# Patient Record
Sex: FEMALE | Race: WHITE | NOT HISPANIC OR LATINO | Employment: OTHER | URBAN - NONMETROPOLITAN AREA
[De-identification: names, ages, dates, MRNs, and addresses within clinical notes are randomized per-mention and may not be internally consistent; named-entity substitution may affect disease eponyms.]

---

## 2020-02-02 ENCOUNTER — APPOINTMENT (EMERGENCY)
Dept: CT IMAGING | Facility: HOSPITAL | Age: 80
DRG: 065 | End: 2020-02-02
Payer: COMMERCIAL

## 2020-02-02 ENCOUNTER — HOSPITAL ENCOUNTER (INPATIENT)
Facility: HOSPITAL | Age: 80
LOS: 6 days | DRG: 065 | End: 2020-02-08
Attending: EMERGENCY MEDICINE | Admitting: INTERNAL MEDICINE
Payer: COMMERCIAL

## 2020-02-02 ENCOUNTER — APPOINTMENT (EMERGENCY)
Dept: RADIOLOGY | Facility: HOSPITAL | Age: 80
DRG: 065 | End: 2020-02-02
Payer: COMMERCIAL

## 2020-02-02 DIAGNOSIS — J18.9 PNEUMONIA OF BOTH LOWER LOBES DUE TO INFECTIOUS ORGANISM: ICD-10-CM

## 2020-02-02 DIAGNOSIS — K59.00 CONSTIPATION: ICD-10-CM

## 2020-02-02 DIAGNOSIS — I63.9 ACUTE CVA (CEREBROVASCULAR ACCIDENT) (HCC): ICD-10-CM

## 2020-02-02 DIAGNOSIS — R53.1 GENERALIZED WEAKNESS: Primary | ICD-10-CM

## 2020-02-02 DIAGNOSIS — G45.9 TRANSIENT ISCHEMIC ATTACK, ACUTE: ICD-10-CM

## 2020-02-02 DIAGNOSIS — I10 ESSENTIAL HYPERTENSION: ICD-10-CM
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
## 2020-02-02 DIAGNOSIS — R26.2 AMBULATORY DYSFUNCTION: ICD-10-CM

## 2020-02-02 LAB
ALBUMIN SERPL BCP-MCNC: 3.6 G/DL (ref 3.5–5)
ALP SERPL-CCNC: 96 U/L (ref 46–116)
ALT SERPL W P-5'-P-CCNC: 21 U/L (ref 12–78)
ANION GAP SERPL CALCULATED.3IONS-SCNC: 11 MMOL/L (ref 4–13)
AST SERPL W P-5'-P-CCNC: 21 U/L (ref 5–45)
BACTERIA UR QL AUTO: ABNORMAL /HPF
BASOPHILS # BLD AUTO: 0.03 THOUSANDS/ΜL (ref 0–0.1)
BASOPHILS NFR BLD AUTO: 0 % (ref 0–1)
BILIRUB SERPL-MCNC: 0.83 MG/DL (ref 0.2–1)
BILIRUB UR QL STRIP: ABNORMAL
BUN SERPL-MCNC: 19 MG/DL (ref 5–25)
CALCIUM SERPL-MCNC: 9 MG/DL (ref 8.3–10.1)
CHLORIDE SERPL-SCNC: 101 MMOL/L (ref 100–108)
CLARITY UR: CLEAR
CO2 SERPL-SCNC: 28 MMOL/L (ref 21–32)
COLOR UR: YELLOW
CREAT SERPL-MCNC: 0.81 MG/DL (ref 0.6–1.3)
EOSINOPHIL # BLD AUTO: 0.02 THOUSAND/ΜL (ref 0–0.61)
EOSINOPHIL NFR BLD AUTO: 0 % (ref 0–6)
ERYTHROCYTE [DISTWIDTH] IN BLOOD BY AUTOMATED COUNT: 13.3 % (ref 11.6–15.1)
FLUAV RNA NPH QL NAA+PROBE: NORMAL
FLUBV RNA NPH QL NAA+PROBE: NORMAL
GFR SERPL CREATININE-BSD FRML MDRD: 70 ML/MIN/1.73SQ M
GLUCOSE SERPL-MCNC: 136 MG/DL (ref 65–140)
GLUCOSE SERPL-MCNC: 142 MG/DL (ref 65–140)
GLUCOSE UR STRIP-MCNC: NEGATIVE MG/DL
HCT VFR BLD AUTO: 46.2 % (ref 34.8–46.1)
HGB BLD-MCNC: 14.7 G/DL (ref 11.5–15.4)
HGB UR QL STRIP.AUTO: ABNORMAL
HYALINE CASTS #/AREA URNS LPF: ABNORMAL /LPF
IMM GRANULOCYTES # BLD AUTO: 0.05 THOUSAND/UL (ref 0–0.2)
IMM GRANULOCYTES NFR BLD AUTO: 0 % (ref 0–2)
KETONES UR STRIP-MCNC: ABNORMAL MG/DL
LACTATE SERPL-SCNC: 1.6 MMOL/L (ref 0.5–2)
LEUKOCYTE ESTERASE UR QL STRIP: NEGATIVE
LYMPHOCYTES # BLD AUTO: 1.3 THOUSANDS/ΜL (ref 0.6–4.47)
LYMPHOCYTES NFR BLD AUTO: 9 % (ref 14–44)
MCH RBC QN AUTO: 25.7 PG (ref 26.8–34.3)
MCHC RBC AUTO-ENTMCNC: 31.8 G/DL (ref 31.4–37.4)
MCV RBC AUTO: 81 FL (ref 82–98)
MONOCYTES # BLD AUTO: 1.28 THOUSAND/ΜL (ref 0.17–1.22)
MONOCYTES NFR BLD AUTO: 9 % (ref 4–12)
NEUTROPHILS # BLD AUTO: 12.4 THOUSANDS/ΜL (ref 1.85–7.62)
NEUTS SEG NFR BLD AUTO: 82 % (ref 43–75)
NITRITE UR QL STRIP: NEGATIVE
NON-SQ EPI CELLS URNS QL MICRO: ABNORMAL /HPF
NRBC BLD AUTO-RTO: 0 /100 WBCS
NT-PROBNP SERPL-MCNC: 298 PG/ML
PH UR STRIP.AUTO: 6 [PH]
PLATELET # BLD AUTO: 277 THOUSANDS/UL (ref 149–390)
PMV BLD AUTO: 11.1 FL (ref 8.9–12.7)
POTASSIUM SERPL-SCNC: 3.7 MMOL/L (ref 3.5–5.3)
PROT SERPL-MCNC: 7.7 G/DL (ref 6.4–8.2)
PROT UR STRIP-MCNC: ABNORMAL MG/DL
RBC # BLD AUTO: 5.72 MILLION/UL (ref 3.81–5.12)
RBC #/AREA URNS AUTO: ABNORMAL /HPF
RSV RNA NPH QL NAA+PROBE: NORMAL
SODIUM SERPL-SCNC: 140 MMOL/L (ref 136–145)
SP GR UR STRIP.AUTO: 1.02 (ref 1–1.03)
TROPONIN I SERPL-MCNC: <0.02 NG/ML
UROBILINOGEN UR QL STRIP.AUTO: 0.2 E.U./DL
WBC # BLD AUTO: 15.08 THOUSAND/UL (ref 4.31–10.16)
WBC #/AREA URNS AUTO: ABNORMAL /HPF

## 2020-02-02 PROCEDURE — 96361 HYDRATE IV INFUSION ADD-ON: CPT

## 2020-02-02 PROCEDURE — 93005 ELECTROCARDIOGRAM TRACING: CPT

## 2020-02-02 PROCEDURE — 96365 THER/PROPH/DIAG IV INF INIT: CPT

## 2020-02-02 PROCEDURE — 83605 ASSAY OF LACTIC ACID: CPT | Performed by: EMERGENCY MEDICINE

## 2020-02-02 PROCEDURE — 73564 X-RAY EXAM KNEE 4 OR MORE: CPT

## 2020-02-02 PROCEDURE — 85025 COMPLETE CBC W/AUTO DIFF WBC: CPT | Performed by: EMERGENCY MEDICINE

## 2020-02-02 PROCEDURE — 83880 ASSAY OF NATRIURETIC PEPTIDE: CPT | Performed by: EMERGENCY MEDICINE

## 2020-02-02 PROCEDURE — 96375 TX/PRO/DX INJ NEW DRUG ADDON: CPT

## 2020-02-02 PROCEDURE — 87449 NOS EACH ORGANISM AG IA: CPT | Performed by: NURSE PRACTITIONER

## 2020-02-02 PROCEDURE — 73030 X-RAY EXAM OF SHOULDER: CPT

## 2020-02-02 PROCEDURE — 82948 REAGENT STRIP/BLOOD GLUCOSE: CPT

## 2020-02-02 PROCEDURE — 70450 CT HEAD/BRAIN W/O DYE: CPT

## 2020-02-02 PROCEDURE — 87631 RESP VIRUS 3-5 TARGETS: CPT | Performed by: EMERGENCY MEDICINE

## 2020-02-02 PROCEDURE — 81001 URINALYSIS AUTO W/SCOPE: CPT | Performed by: EMERGENCY MEDICINE

## 2020-02-02 PROCEDURE — 36415 COLL VENOUS BLD VENIPUNCTURE: CPT | Performed by: EMERGENCY MEDICINE

## 2020-02-02 PROCEDURE — 99285 EMERGENCY DEPT VISIT HI MDM: CPT | Performed by: EMERGENCY MEDICINE

## 2020-02-02 PROCEDURE — 87040 BLOOD CULTURE FOR BACTERIA: CPT | Performed by: EMERGENCY MEDICINE

## 2020-02-02 PROCEDURE — 80053 COMPREHEN METABOLIC PANEL: CPT | Performed by: EMERGENCY MEDICINE

## 2020-02-02 PROCEDURE — 99285 EMERGENCY DEPT VISIT HI MDM: CPT

## 2020-02-02 PROCEDURE — 71046 X-RAY EXAM CHEST 2 VIEWS: CPT

## 2020-02-02 PROCEDURE — 84484 ASSAY OF TROPONIN QUANT: CPT | Performed by: EMERGENCY MEDICINE

## 2020-02-02 PROCEDURE — 99223 1ST HOSP IP/OBS HIGH 75: CPT | Performed by: NURSE PRACTITIONER

## 2020-02-02 RX ORDER — PYRIDOXINE HCL (VITAMIN B6) 100 MG
1 TABLET ORAL 2 TIMES DAILY
COMMUNITY

## 2020-02-02 RX ORDER — METOPROLOL TARTRATE 5 MG/5ML
5 INJECTION INTRAVENOUS ONCE
Status: COMPLETED | OUTPATIENT
Start: 2020-02-02 | End: 2020-02-02

## 2020-02-02 RX ORDER — METOPROLOL SUCCINATE 50 MG/1
50 TABLET, EXTENDED RELEASE ORAL DAILY
COMMUNITY
Start: 2017-03-17 | End: 2020-02-08 | Stop reason: HOSPADM

## 2020-02-02 RX ORDER — CEFTRIAXONE 1 G/50ML
1000 INJECTION, SOLUTION INTRAVENOUS ONCE
Status: COMPLETED | OUTPATIENT
Start: 2020-02-02 | End: 2020-02-02

## 2020-02-02 RX ORDER — SIMVASTATIN 20 MG
20 TABLET ORAL
COMMUNITY
Start: 2017-01-28 | End: 2020-02-08 | Stop reason: HOSPADM

## 2020-02-02 RX ORDER — PANTOPRAZOLE SODIUM 40 MG/1
40 TABLET, DELAYED RELEASE ORAL DAILY
COMMUNITY
Start: 2017-01-20

## 2020-02-02 RX ORDER — ONDANSETRON 2 MG/ML
4 INJECTION INTRAMUSCULAR; INTRAVENOUS ONCE
Status: DISCONTINUED | OUTPATIENT
Start: 2020-02-02 | End: 2020-02-03

## 2020-02-02 RX ADMIN — METOPROLOL TARTRATE 5 MG: 5 INJECTION INTRAVENOUS at 22:06

## 2020-02-02 RX ADMIN — AZITHROMYCIN FOR INJECTION INJECTION, POWDER, LYOPHILIZED, FOR SOLUTION 500 MG: 500 INJECTION INTRAVENOUS at 22:12

## 2020-02-02 RX ADMIN — SODIUM CHLORIDE 1000 ML: 0.9 INJECTION, SOLUTION INTRAVENOUS at 19:28

## 2020-02-02 RX ADMIN — CEFTRIAXONE 1000 MG: 1 INJECTION, SOLUTION INTRAVENOUS at 21:53

## 2020-02-02 NOTE — ED PROVIDER NOTES
History  Chief Complaint   Patient presents with    Weakness - Generalized     Patient started with weakness this morning that worsened throughout the day  Patient has difficulty ambulating due to weakness with 3 falls today  Patient is complaining of dizziness and pain in right shoulder  Patient denies hitting head or LOC  Patient is a 80-year-old female presenting to the emergency department complaining of dizziness and weakness that started earlier today leading to 3 falls, the 1st 2 which she was able to get up and ambulate, the 3rd when she needed assistance, states that when her  was helping her get off the floor he also injured her right shoulder, she denies any head injury or loss of consciousness, denies any chest pain or shortness of breath, no recent illness          Prior to Admission Medications   Prescriptions Last Dose Informant Patient Reported? Taking? Calcium Carb-Cholecalciferol 600-200 MG-UNIT TABS   Yes No   Sig: Take 1 tablet by mouth 2 (two) times a day   metoprolol succinate (TOPROL-XL) 50 mg 24 hr tablet   Yes Yes   Sig: Take 50 mg by mouth daily   pantoprazole (PROTONIX) 40 mg tablet   Yes Yes   Sig: Take 40 mg by mouth daily   psyllium (METAMUCIL) 0 52 g capsule   Yes No   Sig: Take 0 52 g by mouth daily   simvastatin (ZOCOR) 20 mg tablet   Yes Yes   Sig: Take 20 mg by mouth daily with dinner      Facility-Administered Medications: None       Past Medical History:   Diagnosis Date    GERD (gastroesophageal reflux disease)     Hypertension        Past Surgical History:   Procedure Laterality Date    CHOLECYSTECTOMY         History reviewed  No pertinent family history  I have reviewed and agree with the history as documented      Social History     Tobacco Use    Smoking status: Former Smoker    Smokeless tobacco: Never Used   Substance Use Topics    Alcohol use: Never     Frequency: Never    Drug use: Never        Review of Systems   Constitutional: Positive for fatigue  HENT: Negative  Eyes: Negative  Respiratory: Negative  Cardiovascular: Negative  Gastrointestinal: Negative  Endocrine: Negative  Genitourinary: Negative  Musculoskeletal: Negative  Skin: Negative  Allergic/Immunologic: Negative  Neurological: Positive for dizziness and weakness  Hematological: Negative  Psychiatric/Behavioral: Negative  Physical Exam  Physical Exam   Constitutional: She is oriented to person, place, and time  She appears well-developed and well-nourished  HENT:   Head: Normocephalic and atraumatic  Eyes: Pupils are equal, round, and reactive to light  Conjunctivae and EOM are normal    Neck: Normal range of motion  Neck supple  Cardiovascular: Normal rate  Pulmonary/Chest: Effort normal    Abdominal: Soft  Musculoskeletal: Normal range of motion  Right shoulder: She exhibits tenderness  Right knee: Tenderness found  Left knee: Tenderness found  Abrasions and erythema with tenderness to bilateral knees, tenderness with pain with range of motion testing to the right shoulder, distal sensation and motor is intact with all 4 extremities   Neurological: She is alert and oriented to person, place, and time  Skin: Skin is warm and dry  Psychiatric: She has a normal mood and affect         Vital Signs  ED Triage Vitals [02/02/20 1847]   Temperature Pulse Respirations Blood Pressure SpO2   98 °F (36 7 °C) 80 18 (!) 201/91 95 %      Temp Source Heart Rate Source Patient Position - Orthostatic VS BP Location FiO2 (%)   Oral Monitor Lying Left arm --      Pain Score       No Pain           Vitals:    02/02/20 2143 02/02/20 2144 02/02/20 2215 02/02/20 2230   BP: (!) 210/95 (!) 246/116 170/79 154/71   Pulse: 81 94 71 72   Patient Position - Orthostatic VS: Lying - Orthostatic VS Sitting - Orthostatic VS Lying                ED Medications  Medications   ondansetron (ZOFRAN) injection 4 mg (0 mg Intravenous Hold 2/2/20 1925) sodium chloride 0 9 % bolus 1,000 mL (0 mL Intravenous Stopped 2/2/20 2050)   cefTRIAXone (ROCEPHIN) IVPB (premix) 1,000 mg (0 mg Intravenous Stopped 2/2/20 2257)   azithromycin (ZITHROMAX) 500 mg in sodium chloride 0 9 % 250 mL IVPB (500 mg Intravenous New Bag 2/2/20 2212)   metoprolol (LOPRESSOR) injection 5 mg (5 mg Intravenous Given 2/2/20 2206)       Diagnostic Studies  Results Reviewed     Procedure Component Value Units Date/Time    Urine Microscopic [[id number]]  (Abnormal) Collected:  02/02/20 2122    Lab Status:  Final result Specimen:  Urine, Straight Cath Updated:  02/02/20 2143     RBC, UA 0-1 /hpf      WBC, UA None Seen /hpf      Epithelial Cells Occasional /hpf      Bacteria, UA Occasional /hpf      Hyaline Casts, UA 0-1 /lpf     UA w Reflex to Microscopic w Reflex to Culture [[id number]]  (Abnormal) Collected:  02/02/20 2122    Lab Status:  Final result Specimen:  Urine, Straight Cath Updated:  02/02/20 2131     Color, UA Yellow     Clarity, UA Clear     Specific Gravity, UA 1 025     pH, UA 6 0     Leukocytes, UA Negative     Nitrite, UA Negative     Protein, UA Trace mg/dl      Glucose, UA Negative mg/dl      Ketones, UA 40 (2+) mg/dl      Urobilinogen, UA 0 2 E U /dl      Bilirubin, UA Small     Blood, UA Trace-lysed    Influenza A/B and RSV PCR [[id number]]  (Normal) Collected:  02/02/20 1920    Lab Status:  Final result Specimen:  Nasopharyngeal Swab Updated:  02/02/20 2012     INFLUENZA A PCR None Detected     INFLUENZA B PCR None Detected     RSV PCR None Detected    Comprehensive metabolic panel [[id number]]  (Abnormal) Collected:  02/02/20 1916    Lab Status:  Final result Specimen:  Blood from Arm, Left Updated:  02/02/20 2001     Sodium 140 mmol/L      Potassium 3 7 mmol/L      Chloride 101 mmol/L      CO2 28 mmol/L      ANION GAP 11 mmol/L      BUN 19 mg/dL      Creatinine 0 81 mg/dL      Glucose 142 mg/dL      Calcium 9 0 mg/dL      AST 21 U/L      ALT 21 U/L      Alkaline Phosphatase 96 U/L      Total Protein 7 7 g/dL      Albumin 3 6 g/dL      Total Bilirubin 0 83 mg/dL      eGFR 70 ml/min/1 73sq m     Narrative:       [place] guidelines for Chronic Kidney Disease (CKD):     Stage 1 with normal or high GFR (GFR > 90 mL/min/1 73 square meters)    Stage 2 Mild CKD (GFR = 60-89 mL/min/1 73 square meters)    Stage 3A Moderate CKD (GFR = 45-59 mL/min/1 73 square meters)    Stage 3B Moderate CKD (GFR = 30-44 mL/min/1 73 square meters)    Stage 4 Severe CKD (GFR = 15-29 mL/min/1 73 square meters)    Stage 5 End Stage CKD (GFR <15 mL/min/1 73 square meters)  Note: GFR calculation is accurate only with a steady state creatinine    NT-BNP PRO [[id number]]  (Normal) Collected:  02/02/20 1916    Lab Status:  Final result Specimen:  Blood from Arm, Left Updated:  02/02/20 2001     NT-proBNP 298 pg/mL     Lactic acid, plasma x2 [[id number]]  (Normal) Collected:  02/02/20 1919    Lab Status:  Final result Specimen:  Blood from Arm, Left Updated:  02/02/20 1956     LACTIC ACID 1 6 mmol/L     Narrative:       Result may be elevated if tourniquet was used during collection      Troponin I [[id number]]  (Normal) Collected:  02/02/20 1916    Lab Status:  Final result Specimen:  Blood from Arm, Left Updated:  02/02/20 1956     Troponin I <0 02 ng/mL     CBC and differential [[id number]]  (Abnormal) Collected:  02/02/20 1916    Lab Status:  Final result Specimen:  Blood from Arm, Left Updated:  02/02/20 1938     WBC 15 08 Thousand/uL      RBC 5 72 Million/uL      Hemoglobin 14 7 g/dL      Hematocrit 46 2 %      MCV 81 fL      MCH 25 7 pg      MCHC 31 8 g/dL      RDW 13 3 %      MPV 11 1 fL      Platelets 639 Thousands/uL      nRBC 0 /100 WBCs      Neutrophils Relative 82 %      Immat GRANS % 0 %      Lymphocytes Relative 9 %      Monocytes Relative 9 %      Eosinophils Relative 0 %      Basophils Relative 0 %      Neutrophils Absolute 12 40 Thousands/µL      Immature Grans Absolute 0 05 Thousand/uL      Lymphocytes Absolute 1 30 Thousands/µL      Monocytes Absolute 1 28 Thousand/µL      Eosinophils Absolute 0 02 Thousand/µL      Basophils Absolute 0 03 Thousands/µL     Blood culture #1 [[id number]] Collected:  02/02/20 1924    Lab Status: In process Specimen:  Blood from Hand, Left Updated:  02/02/20 1933    Blood culture #2 [[id number]] Collected:  02/02/20 1916    Lab Status: In process Specimen:  Blood from Arm, Left Updated:  02/02/20 1933    Fingerstick Glucose (POCT) [[id number]]  (Normal) Collected:  02/02/20 1909    Lab Status:  Final result Updated:  02/02/20 1913     POC Glucose 136 mg/dl                  CT head without contrast   Final Result by Sheridan Lorenzo MD (02/02 2152)      No acute intracranial abnormality                    Workstation performed: [id number]         XR chest 2 views   ED Interpretation by Verona Villatoro DO (02/02 2134)   bibasilar atx vs infiltrate      XR knee 4+ vw left injury   ED Interpretation by Verona Villatoro DO (02/02 2135)   No acute findings      XR knee 4+ vw right injury   ED Interpretation by Verona Villatoro DO (02/02 2135)   No acute findings      XR shoulder 2+ views RIGHT   ED Interpretation by Verona Villatoro DO (02/02 2135)   No acute findings                 Procedures  ECG 12 Lead Documentation Only  Date/Time: 2/2/2020 7:07 PM  Performed by: Verona Villatoro DO  Authorized by: Verona Villatoro DO     Indications / Diagnosis:  Weakness  ECG reviewed by me, the ED Provider: yes    Patient location:  ED  Previous ECG:     Comparison to cardiac monitor: Yes    Interpretation:     Interpretation: normal    Rate:     ECG rate:  76    ECG rate assessment: normal    Rhythm:     Rhythm: sinus rhythm    Ectopy:     Ectopy: none    QRS:     QRS intervals:  Normal  Conduction:     Conduction: normal    ST segments:     ST segments:  Normal  T waves:     T waves: normal               ED Course  ED Course as of Feb 02 2313   Sun Feb 02, 2020 2312 Patient with generalized weakness and dizziness leading to 3 falls today, mild leukocytosis with likely bibasilar pneumonia, started on IV antibiotics and discussed with hospitalist service for admission, patient and family in agreement with this                                      Disposition  Final diagnoses:   Generalized weakness   Pneumonia of both lower lobes due to infectious organism [facility])   Ambulatory dysfunction     Time reflects when diagnosis was documented in both MDM as applicable and the Disposition within this note     Time User Action Codes Description Comment    2/2/2020 10:21 PM Xenia Sutherland [R53 1] Generalized weakness     2/2/2020 10:22 PM Xenia Sutherland Add [J18 1] Pneumonia of both lower lobes due to infectious organism ([facility] [address] )     2/2/2020 10:22 PM Xenia Sutherland [R26 2] Ambulatory dysfunction       ED Disposition     ED Disposition Condition Date/Time Comment    Admit Stable Sun Feb 2, 2020 10:21 PM Case was discussed with [facility]ED Tellez and the patient's admission status was agreed to be Admission Status: inpatient status to the service of Dr Yin Stanley   Follow-up Information    None         Patient's Medications   Discharge Prescriptions    No medications on file     No discharge procedures on file      ED Provider  Electronically Signed by           Isaac Hernandez DO  02/02/20 5093

## 2020-02-03 ENCOUNTER — APPOINTMENT (INPATIENT)
Dept: NON INVASIVE DIAGNOSTICS | Facility: HOSPITAL | Age: 80
DRG: 065 | End: 2020-02-03
Payer: COMMERCIAL

## 2020-02-03 ENCOUNTER — APPOINTMENT (INPATIENT)
Dept: MRI IMAGING | Facility: HOSPITAL | Age: 80
DRG: 065 | End: 2020-02-03
Payer: COMMERCIAL

## 2020-02-03 PROBLEM — R53.1 GENERALIZED WEAKNESS: Status: ACTIVE | Noted: 2020-02-03

## 2020-02-03 PROBLEM — K21.9 GASTROESOPHAGEAL REFLUX DISEASE WITHOUT ESOPHAGITIS: Status: ACTIVE | Noted: 2020-02-03

## 2020-02-03 LAB
ANION GAP SERPL CALCULATED.3IONS-SCNC: 9 MMOL/L (ref 4–13)
BASOPHILS # BLD AUTO: 0.02 THOUSANDS/ΜL (ref 0–0.1)
BASOPHILS NFR BLD AUTO: 0 % (ref 0–1)
BUN SERPL-MCNC: 14 MG/DL (ref 5–25)
CALCIUM SERPL-MCNC: 8 MG/DL (ref 8.3–10.1)
CHLORIDE SERPL-SCNC: 104 MMOL/L (ref 100–108)
CO2 SERPL-SCNC: 27 MMOL/L (ref 21–32)
CREAT SERPL-MCNC: 0.62 MG/DL (ref 0.6–1.3)
EOSINOPHIL # BLD AUTO: 0.06 THOUSAND/ΜL (ref 0–0.61)
EOSINOPHIL NFR BLD AUTO: 1 % (ref 0–6)
ERYTHROCYTE [DISTWIDTH] IN BLOOD BY AUTOMATED COUNT: 13.5 % (ref 11.6–15.1)
GFR SERPL CREATININE-BSD FRML MDRD: 87 ML/MIN/1.73SQ M
GLUCOSE SERPL-MCNC: 148 MG/DL (ref 65–140)
HCT VFR BLD AUTO: 41.2 % (ref 34.8–46.1)
HGB BLD-MCNC: 12.9 G/DL (ref 11.5–15.4)
IMM GRANULOCYTES # BLD AUTO: 0.02 THOUSAND/UL (ref 0–0.2)
IMM GRANULOCYTES NFR BLD AUTO: 0 % (ref 0–2)
L PNEUMO1 AG UR QL IA.RAPID: NEGATIVE
LYMPHOCYTES # BLD AUTO: 1.51 THOUSANDS/ΜL (ref 0.6–4.47)
LYMPHOCYTES NFR BLD AUTO: 15 % (ref 14–44)
MCH RBC QN AUTO: 25.2 PG (ref 26.8–34.3)
MCHC RBC AUTO-ENTMCNC: 31.3 G/DL (ref 31.4–37.4)
MCV RBC AUTO: 81 FL (ref 82–98)
MONOCYTES # BLD AUTO: 0.95 THOUSAND/ΜL (ref 0.17–1.22)
MONOCYTES NFR BLD AUTO: 9 % (ref 4–12)
NEUTROPHILS # BLD AUTO: 7.51 THOUSANDS/ΜL (ref 1.85–7.62)
NEUTS SEG NFR BLD AUTO: 75 % (ref 43–75)
NRBC BLD AUTO-RTO: 0 /100 WBCS
PLATELET # BLD AUTO: 238 THOUSANDS/UL (ref 149–390)
PMV BLD AUTO: 11.1 FL (ref 8.9–12.7)
POTASSIUM SERPL-SCNC: 3.7 MMOL/L (ref 3.5–5.3)
PROCALCITONIN SERPL-MCNC: 0.05 NG/ML
PROCALCITONIN SERPL-MCNC: <0.05 NG/ML
RBC # BLD AUTO: 5.11 MILLION/UL (ref 3.81–5.12)
S PNEUM AG UR QL: NEGATIVE
SODIUM SERPL-SCNC: 140 MMOL/L (ref 136–145)
WBC # BLD AUTO: 10.07 THOUSAND/UL (ref 4.31–10.16)

## 2020-02-03 PROCEDURE — 93306 TTE W/DOPPLER COMPLETE: CPT

## 2020-02-03 PROCEDURE — 93306 TTE W/DOPPLER COMPLETE: CPT | Performed by: INTERNAL MEDICINE

## 2020-02-03 PROCEDURE — 99233 SBSQ HOSP IP/OBS HIGH 50: CPT | Performed by: INTERNAL MEDICINE

## 2020-02-03 PROCEDURE — 93880 EXTRACRANIAL BILAT STUDY: CPT

## 2020-02-03 PROCEDURE — B24BZZ4 ULTRASONOGRAPHY OF HEART WITH AORTA, TRANSESOPHAGEAL: ICD-10-PCS | Performed by: INTERNAL MEDICINE

## 2020-02-03 PROCEDURE — 92610 EVALUATE SWALLOWING FUNCTION: CPT

## 2020-02-03 PROCEDURE — 84145 PROCALCITONIN (PCT): CPT | Performed by: NURSE PRACTITIONER

## 2020-02-03 PROCEDURE — 94760 N-INVAS EAR/PLS OXIMETRY 1: CPT

## 2020-02-03 PROCEDURE — 70551 MRI BRAIN STEM W/O DYE: CPT

## 2020-02-03 PROCEDURE — 93880 EXTRACRANIAL BILAT STUDY: CPT | Performed by: SURGERY

## 2020-02-03 PROCEDURE — 85025 COMPLETE CBC W/AUTO DIFF WBC: CPT | Performed by: NURSE PRACTITIONER

## 2020-02-03 PROCEDURE — 80048 BASIC METABOLIC PNL TOTAL CA: CPT | Performed by: NURSE PRACTITIONER

## 2020-02-03 RX ORDER — ATORVASTATIN CALCIUM 40 MG/1
40 TABLET, FILM COATED ORAL EVERY EVENING
Status: DISCONTINUED | OUTPATIENT
Start: 2020-02-03 | End: 2020-02-08 | Stop reason: HOSPADM

## 2020-02-03 RX ORDER — PRAVASTATIN SODIUM 40 MG
40 TABLET ORAL
Status: DISCONTINUED | OUTPATIENT
Start: 2020-02-03 | End: 2020-02-03

## 2020-02-03 RX ORDER — PANTOPRAZOLE SODIUM 40 MG/1
40 TABLET, DELAYED RELEASE ORAL DAILY
Status: DISCONTINUED | OUTPATIENT
Start: 2020-02-03 | End: 2020-02-08 | Stop reason: HOSPADM

## 2020-02-03 RX ORDER — B-COMPLEX WITH VITAMIN C
1 TABLET ORAL 2 TIMES DAILY
Status: DISCONTINUED | OUTPATIENT
Start: 2020-02-03 | End: 2020-02-04

## 2020-02-03 RX ORDER — METOPROLOL TARTRATE 5 MG/5ML
5 INJECTION INTRAVENOUS EVERY 6 HOURS PRN
Status: DISCONTINUED | OUTPATIENT
Start: 2020-02-03 | End: 2020-02-04

## 2020-02-03 RX ORDER — ACETAMINOPHEN 325 MG/1
650 TABLET ORAL EVERY 6 HOURS PRN
Status: DISCONTINUED | OUTPATIENT
Start: 2020-02-03 | End: 2020-02-08 | Stop reason: HOSPADM

## 2020-02-03 RX ORDER — ONDANSETRON 2 MG/ML
4 INJECTION INTRAMUSCULAR; INTRAVENOUS EVERY 6 HOURS PRN
Status: DISCONTINUED | OUTPATIENT
Start: 2020-02-03 | End: 2020-02-08 | Stop reason: HOSPADM

## 2020-02-03 RX ORDER — HYDRALAZINE HYDROCHLORIDE 20 MG/ML
10 INJECTION INTRAMUSCULAR; INTRAVENOUS EVERY 4 HOURS PRN
Status: DISCONTINUED | OUTPATIENT
Start: 2020-02-03 | End: 2020-02-04

## 2020-02-03 RX ORDER — METOPROLOL TARTRATE 5 MG/5ML
5 INJECTION INTRAVENOUS ONCE
Status: COMPLETED | OUTPATIENT
Start: 2020-02-03 | End: 2020-02-03

## 2020-02-03 RX ORDER — CEFTRIAXONE 1 G/50ML
1000 INJECTION, SOLUTION INTRAVENOUS EVERY 24 HOURS
Status: DISCONTINUED | OUTPATIENT
Start: 2020-02-03 | End: 2020-02-03

## 2020-02-03 RX ORDER — ASPIRIN 81 MG/1
81 TABLET, CHEWABLE ORAL DAILY
Status: DISCONTINUED | OUTPATIENT
Start: 2020-02-03 | End: 2020-02-08 | Stop reason: HOSPADM

## 2020-02-03 RX ORDER — METOPROLOL SUCCINATE 50 MG/1
50 TABLET, EXTENDED RELEASE ORAL DAILY
Status: DISCONTINUED | OUTPATIENT
Start: 2020-02-03 | End: 2020-02-03

## 2020-02-03 RX ORDER — LISINOPRIL 10 MG/1
10 TABLET ORAL DAILY
Status: DISCONTINUED | OUTPATIENT
Start: 2020-02-03 | End: 2020-02-03

## 2020-02-03 RX ORDER — GUAIFENESIN 600 MG
600 TABLET, EXTENDED RELEASE 12 HR ORAL 2 TIMES DAILY
Status: DISCONTINUED | OUTPATIENT
Start: 2020-02-03 | End: 2020-02-04

## 2020-02-03 RX ADMIN — Medication 1 TABLET: at 08:22

## 2020-02-03 RX ADMIN — Medication 1 TABLET: at 17:33

## 2020-02-03 RX ADMIN — METOPROLOL TARTRATE 5 MG: 5 INJECTION INTRAVENOUS at 02:10

## 2020-02-03 RX ADMIN — GUAIFENESIN 600 MG: 600 TABLET ORAL at 08:20

## 2020-02-03 RX ADMIN — ASPIRIN 81 MG 81 MG: 81 TABLET ORAL at 13:45

## 2020-02-03 RX ADMIN — METOPROLOL SUCCINATE 50 MG: 50 TABLET, EXTENDED RELEASE ORAL at 06:20

## 2020-02-03 RX ADMIN — PANTOPRAZOLE SODIUM 40 MG: 40 TABLET, DELAYED RELEASE ORAL at 08:20

## 2020-02-03 RX ADMIN — LISINOPRIL 10 MG: 10 TABLET ORAL at 08:20

## 2020-02-03 RX ADMIN — METOPROLOL TARTRATE 5 MG: 5 INJECTION INTRAVENOUS at 16:47

## 2020-02-03 RX ADMIN — ENOXAPARIN SODIUM 40 MG: 40 INJECTION SUBCUTANEOUS at 08:20

## 2020-02-03 RX ADMIN — PSYLLIUM HUSK 1 PACKET: 3.4 POWDER ORAL at 08:20

## 2020-02-03 RX ADMIN — ATORVASTATIN CALCIUM 40 MG: 40 TABLET, FILM COATED ORAL at 17:33

## 2020-02-03 RX ADMIN — METOPROLOL TARTRATE 5 MG: 1 INJECTION, SOLUTION INTRAVENOUS at 04:31

## 2020-02-03 RX ADMIN — GUAIFENESIN 600 MG: 600 TABLET ORAL at 17:33

## 2020-02-03 NOTE — ED NOTES
Pt urinated in bed pan small amount, removed wet chux, added clean chux and cleaned pt       [place]  02/02/20 0909

## 2020-02-03 NOTE — ASSESSMENT & PLAN NOTE
· Multiple falls at home due to generalized weakness  · CT head: negative for acute intracranial abnormality  · Right shoulder x-ray:  No acute findings  Radiology read is pending  · Right knee x-ray: No acute findings  Radiology read is pending  · Left knee x-ray: No acute findings    Radiology read is pending  · PT OT consult

## 2020-02-03 NOTE — UTILIZATION REVIEW
Initial Clinical Review    Admission: Date/Time/Statement: Inpatient Admission Orders (From admission, onward)     Ordered        02/02/20 2222  Inpatient Admission  Once                   Orders Placed This Encounter   Procedures    Inpatient Admission     Standing Status:   Standing     Number of Occurrences:   1     Order Specific Question:   Admitting Physician     Answer:   Lisa Ramachandran [[id number]]     Order Specific Question:   Level of Care     Answer:   Med Surg [[id number]]     Order Specific Question:   Estimated length of stay     Answer:   More than 2 Midnights     Order Specific Question:   Certification     Answer:   I certify that inpatient services are medically necessary for this patient for a duration of greater than two midnights  See H&P and MD Progress Notes for additional information about the patient's course of treatment  ED Arrival Information     Expected Arrival Acuity Means of Arrival Escorted By Service Admission Type    - 2/2/2020 18:44 Emergent Ambulance [facility] Emergency    Arrival Complaint    numbness        Chief Complaint   Patient presents with    Weakness - Generalized     Patient started with weakness this morning that worsened throughout the day  Patient has difficulty ambulating due to weakness with 3 falls today  Patient is complaining of dizziness and pain in right shoulder  Patient denies hitting head or LOC  Assessment/Plan: 70year old female to the ED from home via EMS with difficulty ambulating, weakness, dizziness  Admitted to inpatient for pneumonia, falls  SHe has sustained 3 falls recently due to weakness, sustaining a right shoulder injury and bilateral knee tenderness with abrasions noted  Flu -  Blood cultures pending  WBC 15   IV abx started in the ED  She has a cough  BP elevated  GIven dose of IV lopressor in ED  Continue to monitor and treat with prn lopressor  Continue lisinopril and metoprolol     ED Triage Vitals [02/02/20 1847]   Temperature Pulse Respirations Blood Pressure SpO2   98 °F (36 7 °C) 80 18 (!) 201/91 95 %      Temp Source Heart Rate Source Patient Position - Orthostatic VS BP Location FiO2 (%)   Oral Monitor Lying Left arm --      Pain Score       No Pain        Wt Readings from Last 1 Encounters:   02/03/20 68 8 kg (151 lb 10 8 oz)     Additional Vital Signs:  Date/Time Temp Pulse Resp BP MAP (mmHg) SpO2 O2 Device Patient Position - Orthostatic VS   02/03/20 0816  74 18   95 % None (Room air)    02/03/20 0755 98 °F (36 7 °C) 76 16 130/98 112 98 % None (Room air) Lying   02/03/20 0619  75  187/89Abnormal  124   Lying   02/03/20 0505  77  199/96Abnormal  120   Lying   02/03/20 0440  70  177/104Abnormal  111   Lying   02/03/20 0350  76 18 194/93Abnormal  141 96 % None (Room air) Lying   02/03/20 0237  72 18   96 %     02/03/20 0230  69  188/95Abnormal  136 96 %     02/03/20 0200 97 6 °F (36 4 °C) 75  164/73 105 95 % None (Room air) Lying   02/03/20 0147  73  195/83Abnormal  119 94 %     02/03/20 0115  77 22 179/86Abnormal   95 %     02/03/20 0045  74 22 167/86  95 %     02/03/20 0030  74 21 174/79Abnormal   95 % None (Room air)    02/02/20 2315  69 22 148/71  94 % None (Room air)    02/02/20 2230  72 22 154/71  93 %     02/02/20 2215  71 21 170/79  96 %  Lying   02/02/20 2144  94 20 246/116Abnormal      Sitting - Orthostatic VS   BP: pt having difficulty sitting on side of bed at 02/02/20 2144   02/02/20 2143  81 20 210/95Abnormal          Pertinent Labs/Diagnostic Test Results:   CT head 2/2:  No acute intracranial abnormality  CXR 2/3: Discoid atelectasis left base  XR left knee 2/3: Discoid atelectasis left base  XR right knee 2/3: Mild degenerative arthritis  No acute osseous abnormality  Xray right shoulder 2/3: Mild degenerative arthritis  No acute osseous abnormality     EKG 2/2:  Interpretation:     Interpretation: normal    Rate:     ECG rate:  76 ECG rate assessment: normal    Rhythm:     Rhythm: sinus rhythm    Ectopy:     Ectopy: none    QRS:     QRS intervals:  Normal  Conduction:     Conduction: normal    ST segments:     ST segments:  Normal  T waves:     T waves: normal    Results from last 7 days   Lab Units 02/03/20  0430 02/02/20  1916   WBC Thousand/uL 10 07 15 08*   HEMOGLOBIN g/dL 12 9 14 7   HEMATOCRIT % 41 2 46 2*   PLATELETS Thousands/uL 238 277   NEUTROS ABS Thousands/µL 7 51 12 40*         Results from last 7 days   Lab Units 02/03/20  0430 02/02/20  1916   SODIUM mmol/L 140 140   POTASSIUM mmol/L 3 7 3 7   CHLORIDE mmol/L 104 101   CO2 mmol/L 27 28   ANION GAP mmol/L 9 11   BUN mg/dL 14 19   CREATININE mg/dL 0 62 0 81   EGFR ml/min/1 73sq m 87 70   CALCIUM mg/dL 8 0* 9 0     Results from last 7 days   Lab Units 02/02/20  1916   AST U/L 21   ALT U/L 21   ALK PHOS U/L 96   TOTAL PROTEIN g/dL 7 7   ALBUMIN g/dL 3 6   TOTAL BILIRUBIN mg/dL 0 83     Results from last 7 days   Lab Units 02/02/20  1909   POC GLUCOSE mg/dl 136     Results from last 7 days   Lab Units 02/03/20  0430 02/02/20  1916   GLUCOSE RANDOM mg/dL 148* 142*         Results from last 7 days   Lab Units 02/02/20  1916   TROPONIN I ng/mL <0 02     Results from last 7 days   Lab Units 02/03/20  0430 02/03/20  0228   PROCALCITONIN ng/ml 0 05 <0 05     Results from last 7 days   Lab Units 02/02/20  1919   LACTIC ACID mmol/L 1 6     Results from last 7 days   Lab Units 02/02/20  1916   NT-PRO BNP pg/mL 298     Results from last 7 days   Lab Units 02/02/20  2122   CLARITY UA  Clear   COLOR UA  Yellow   SPEC GRAV UA  1 025   PH UA  6 0   GLUCOSE UA mg/dl Negative   KETONES UA mg/dl 40 (2+)*   BLOOD UA  Trace-lysed*   PROTEIN UA mg/dl Trace*   NITRITE UA  Negative   BILIRUBIN UA  Small*   UROBILINOGEN UA E U /dl 0 2   LEUKOCYTES UA  Negative   WBC UA /hpf None Seen   RBC UA /hpf 0-1*   BACTERIA UA /hpf Occasional   EPITHELIAL CELLS WET PREP /hpf Occasional     Results from last 7 days Lab Units 02/02/20  1920   INFLUENZA A PCR  None Detected   INFLUENZA B PCR  None Detected   RSV PCR  None Detected     Results from last 7 days   Lab Units 02/02/20  1924 02/02/20  1916   BLOOD CULTURE  Received in Microbiology Lab  Culture in Progress  Received in Microbiology Lab  Culture in Progress  ED Treatment:   Medication Administration from 02/02/2020 1844 to 02/03/2020 0143       Date/Time Order Dose Route Action     02/02/2020 1928 sodium chloride 0 9 % bolus 1,000 mL 1,000 mL Intravenous New Bag     02/02/2020 2153 cefTRIAXone (ROCEPHIN) IVPB (premix) 1,000 mg 1,000 mg Intravenous New Bag     02/02/2020 2212 azithromycin (ZITHROMAX) 500 mg in sodium chloride 0 9 % 250 mL IVPB 500 mg Intravenous New Bag     02/02/2020 2206 metoprolol (LOPRESSOR) injection 5 mg 5 mg Intravenous Given        Past Medical History:   Diagnosis Date    GERD (gastroesophageal reflux disease)     Hypertension      Admitting Diagnosis: Numbness [R20 0]  Generalized weakness [R53 1]  Ambulatory dysfunction [R26 2]  Pneumonia of both lower lobes due to infectious organism ([facility] [address] ) [J18 1]  Age/Sex: 70 y o  female  Admission Orders:  Orthostatic vs  SPutum culture and gram stain  Urine for strep pneumoniae, legionella antigen  PT/OT  Scheduled Medications:    Medications:  cefTRIAXone 1,000 mg Intravenous Q24H   And      azithromycin 500 mg Intravenous Q24H   calcium carbonate-vitamin D 1 tablet Oral BID   enoxaparin 40 mg Subcutaneous Daily   guaiFENesin 600 mg Oral BID   lisinopril 10 mg Oral Daily   metoprolol succinate 50 mg Oral Daily   ondansetron 4 mg Intravenous Once   pantoprazole 40 mg Oral Daily   pravastatin 40 mg Oral Daily With Dinner   psyllium 1 packet Oral Daily     Continuous IV Infusions:     PRN Meds:    acetaminophen 650 mg Oral Q6H PRN   ondansetron 4 mg Intravenous Q6H PRN       Network Utilization Review Department  [email or web address]  ATTENTION: Please call with any questions or concerns to [phone] and carefully listen to the prompts so that you are directed to the right person  All voicemails are confidential   Kelly Mujica all requests for admission clinical reviews, approved or denied determinations and any other requests to dedicated fax number below belonging to the campus where the patient is receiving treatment   List of dedicated fax numbers for the Facilities:  [address] DENIALS (Administrative/Medical Necessity) [phone]   [address] (Maternity/NICU/Pediatrics) [phone]   Anisa Marcum [phone]   [facility] [phone]   Trisha Uribe [phone]   Arabella Ang [phone]   [address] [place] [address] [phone]   [facility]  [phone]   [address] [facility]  [address] [facility] [address] [facility] [phone]

## 2020-02-03 NOTE — RESPIRATORY THERAPY NOTE
RT Protocol Note  Jamey Graff 70 y o  female MRN: [id number]  Unit/Bed#: -01 Encounter: [id number]    Assessment    Principal Problem:    Pneumonia due to infectious organism  Active Problems:    Essential hypertension    Fall      Home Pulmonary Medications:  None       Past Medical History:   Diagnosis Date    GERD (gastroesophageal reflux disease)     Hypertension      Social History     Socioeconomic History    Marital status: /Civil Union     Spouse name: None    Number of children: None    Years of education: None    Highest education level: None   Occupational History    None   Social Needs    Financial resource strain: None    Food insecurity:     Worry: None     Inability: None    Transportation needs:     Medical: None     Non-medical: None   Tobacco Use    Smoking status: Former Smoker    Smokeless tobacco: Never Used   Substance and Sexual Activity    Alcohol use: Never     Frequency: Never    Drug use: Never    Sexual activity: None   Lifestyle    Physical activity:     Days per week: None     Minutes per session: None    Stress: None   Relationships    Social connections:     Talks on phone: None     Gets together: None     Attends [employer] service: None     Active member of club or organization: None     Attends meetings of clubs or organizations: None     Relationship status: None    Intimate partner violence:     Fear of current or ex partner: None     Emotionally abused: None     Physically abused: None     Forced sexual activity: None   Other Topics Concern    None   Social History Narrative    None       Subjective    Subjective Data: Patient is a former pack per day smoker who quit more than 20 years ago  "when I turned 50, it was my resolution to myself "  Patient denies home O2 use or use of respiratory medication    Objective    Physical Exam:   Assessment Type: Assess only  General Appearance: Alert, Awake  Respiratory Pattern: Normal  Chest Assessment: Chest expansion symmetrical  Bilateral Breath Sounds: Diminished    Vitals:  Blood pressure 164/73, pulse 72, temperature 97 6 °F (36 4 °C), temperature source Oral, resp  rate 18, height 5' 7" (1 702 m), weight 68 8 kg (151 lb 10 8 oz), SpO2 96 %            Imaging and other studies: Pending          Plan    Respiratory Plan: No distress/Pulmonary history        Resp Comments: Patient denies any hsiortness of breath or additional work of breathing

## 2020-02-03 NOTE — PROGRESS NOTES
Progress Note - Korean Drought 1940, 70 y o  female MRN: [id number]    Unit/Bed#: -01 Encounter: [id number]    Primary Care Provider: Crystal Danielson DO   Date and time admitted to hospital: 2020  6:44 PM        * Generalized weakness  Assessment & Plan  Patient presented with generalized weakness, recurrent falls, patient worsening right-sided weakness today    Suspected acute CVA, likely left MCA territory, start stroke pathway, will obtain Neurology consult if MRI is positive for acute CVA  Generalized weakness was initially thought secondary to pneumonia, pneumonia has been ruled out  Fall  Assessment & Plan  · Multiple falls at home due to generalized weakness, suspected acute CVA  · CT head: negative for acute intracranial abnormality initiate stroke pathway    · Right shoulder x-ray:  No acute findings  Radiology read is pending  · Right knee x-ray: No acute findings  Radiology read is pending  · Left knee x-ray: No acute findings  Radiology read is pending      Essential hypertension  Assessment & Plan  · BP initially elevated, treated with metoprolol in the ER  · Patient says she did not take her meds today  · Discontinue lisinopril metoprolol, allow for permissive hypertension pending further evaluation  · Monitor blood pressure      Pneumonia due to infectious organism  Assessment & Plan  · Negative procalcitonin x2, pneumonia has been ruled out, discontinue antibiotics  Gastroesophageal reflux disease without esophagitis  Assessment & Plan  · Currently controlled  · Continue Protonix      VTE Pharmacologic Prophylaxis:  Lovenox      Code Status: Level 1 - Full Code      Subjective:   Patient any specific pain patient a very historian, she has right-sided      Objective:     Vitals:   Temp (24hrs), Av 9 °F (36 6 °C), Min:97 6 °F (36 4 °C), Max:98 1 °F (36 7 °C)    Temp:  [97 6 °F (36 4 °C)-98 1 °F (36 7 °C)] 98 1 °F (36 7 °C)  HR:  [69-94] 85  Resp:  [16-22] 21  BP: (130-246)/() 194/90  SpO2:  [93 %-98 %] 94 %  Body mass index is 23 76 kg/m²  Input and Output Summary (last 24 hours): Intake/Output Summary (Last 24 hours) at 2/3/2020 1556  Last data filed at 2/3/2020 0830  Gross per 24 hour   Intake 1900 ml   Output    Net 1900 ml       Physical Exam:     Physical Exam   Constitutional: She appears well-developed and well-nourished  No distress  HENT:   Head: Normocephalic and atraumatic  Right Ear: External ear normal    Left Ear: External ear normal    Eyes: Pupils are equal, round, and reactive to light  Conjunctivae and EOM are normal  Right eye exhibits no discharge  Left eye exhibits no discharge  Cardiovascular: Normal rate, regular rhythm, normal heart sounds and intact distal pulses  Pulmonary/Chest: Effort normal and breath sounds normal  No stridor  No respiratory distress  Abdominal: Soft  Bowel sounds are normal  She exhibits no distension  There is no tenderness  Neurological: No sensory deficit  She exhibits normal muscle tone  Patient with 1/5 muscle strength right lower extremity    5/5 muscle strength left lower extremity    2/5 to 3 to 5 muscle strength right upper extremity    5/5 muscle strength left upper extremity    Tongue deviates to the right, no facial droop noted  Skin: Skin is warm  She is not diaphoretic  Nursing note and vitals reviewed  Additional Data:     Labs:    Results from last 7 days   Lab Units 02/03/20  0430   WBC Thousand/uL 10 07   HEMOGLOBIN g/dL 12 9   HEMATOCRIT % 41 2   PLATELETS Thousands/uL 238   NEUTROS PCT % 75   LYMPHS PCT % 15   MONOS PCT % 9   EOS PCT % 1     Results from last 7 days   Lab Units 02/03/20  0430 02/02/20  1916   POTASSIUM mmol/L 3 7 3 7   CHLORIDE mmol/L 104 101   CO2 mmol/L 27 28   BUN mg/dL 14 19   CREATININE mg/dL 0 62 0 81   CALCIUM mg/dL 8 0* 9 0   ALK PHOS U/L  --  96   ALT U/L  --  21   AST U/L  --  21           * I Have Reviewed All Lab Data Listed Above    * Additional Pertinent Lab Tests Reviewed: Latrice Azar Admission Reviewed        Recent Cultures (last 7 days):     Results from last 7 days   Lab Units 02/02/20 2122 02/02/20 1924 02/02/20 1916   BLOOD CULTURE   --  Received in Microbiology Lab  Culture in Progress  Received in Microbiology Lab  Culture in Progress     LEGIONELLA URINARY ANTIGEN  Negative  --   --        Last 24 Hours Medication List:     Current Facility-Administered Medications:  acetaminophen 650 mg Oral Q6H PRN Noman Maple, CRNP   aspirin 81 mg Oral Daily Elisa Boland DO   atorvastatin 40 mg Oral QPM Elisa Boland DO   calcium carbonate-vitamin D 1 tablet Oral BID Noman Maple, CRNP   enoxaparin 40 mg Subcutaneous Daily Noman Maple, CRNP   guaiFENesin 600 mg Oral BID Noman Maple, CRNP   ondansetron 4 mg Intravenous Q6H PRN Noman Maple, CRNP   pantoprazole 40 mg Oral Daily Noman Maple, CRNP   psyllium 1 packet Oral Daily Noman Maple, CRNP        Today, Patient Was Seen By: Elisa Boland DO

## 2020-02-03 NOTE — ED NOTES
Unable to obtain full set of orthostatic vital signs due to patient's weakness  Physician made aware       Anne Louis RN  02/02/20 9713

## 2020-02-03 NOTE — ED NOTES
Pt asked for bedpan, placed one beneath her, chux pad she is on is wet, will check back in a few minutes to clean up and change bedding/chux       Sadiq Pineda  02/02/20 5590

## 2020-02-03 NOTE — H&P
[facility] Internal Medicine    H&P- Thomes Blow 1940, 70 y o  female MRN: [id number]    Unit/Bed#: -01 Encounter: [id number]    Primary Care Provider: Gee Solitario DO   Date and time admitted to hospital: 2/2/2020  6:44 PM        * Pneumonia due to infectious organism  Assessment & Plan  · Chest x-ray:  Left pleural effusion, right lung opacity  Radiology read is pending  · WBC 15  · Flu negative  · Lactic acid 1 6  · Blood cultures pending  · Received azithromycin and ceftriaxone in the ER-continue per CAP pathway  · Check procalcitonin  · Check strep pneumoniae and Legionella antigen  · Respiratory and oxygen protocol    Gastroesophageal reflux disease without esophagitis  Assessment & Plan  · Currently controlled  · Continue Protonix    Fall  Assessment & Plan  · Multiple falls at home due to generalized weakness  · CT head: negative for acute intracranial abnormality  · Right shoulder x-ray:  No acute findings  Radiology read is pending  · Right knee x-ray: No acute findings  Radiology read is pending  · Left knee x-ray: No acute findings  Radiology read is pending  · PT OT consult    Essential hypertension  Assessment & Plan  · BP initially elevated, treated with metoprolol in the ER  · Patient says she did not take her meds today  · Continue lisinopril and metoprolol  · Monitor blood pressure  · Use IV Lopressor 5 mg p r n  VTE Prophylaxis: Enoxaparin (Lovenox)  / reason for no mechanical VTE prophylaxis VTE score 4   Code Status: full  POLST: POLST form is not discussed and not completed at this time  Discussion with family: patient    Anticipated Length of Stay:  Patient will be admitted on an Inpatient basis with an anticipated length of stay of  greater than 2 midnights  Justification for Hospital Stay:  As described and plan above    Total Time for Visit, including Counseling / Coordination of Care: 45 minutes    Greater than 50% of this total time spent on direct patient counseling and coordination of care  Chief Complaint:   Generalized weakness, falls    History of Present Illness:    Jyotsna Sauer is a 70 y o  female with history of essential hypertension and GERD who presents with a one day history of cough, dizziness, and weakness resulting in several falls  She did not hit her head, but she says her legs are very weak, and gave out twice  She was able to get up by herself the first two times, but the 3rd time her  had to help her get up off the floor and pulled her right shoulder, she is experiencing pain  She says her cough is productive of sputum, she did not know the color  The dizziness is more of a lightheadedness, which is worsened when she stands up  The weakness is bilateral and non-focal   She denies fever or chills, chest pain or shortness of breath, nausea or vomiting, diarrhea, hematemesis  She does say that she had a decreased appetite and did not take her medication today  Review of Systems:    Review of Systems   Constitutional: Negative for chills and fever  Respiratory: Positive for cough  Negative for shortness of breath  Cardiovascular: Negative for chest pain, palpitations and leg swelling  Gastrointestinal: Negative for abdominal distention, abdominal pain, constipation, diarrhea, nausea and vomiting  Genitourinary: Negative for dysuria and frequency  Musculoskeletal: Negative for arthralgias and myalgias  Neurological: Positive for weakness  Negative for dizziness, syncope and headaches  All other systems reviewed and are negative  Past Medical and Surgical History:     Past Medical History:   Diagnosis Date    GERD (gastroesophageal reflux disease)     Hypertension        Past Surgical History:   Procedure Laterality Date    CHOLECYSTECTOMY         Meds/Allergies:    Prior to Admission medications    Not on File     I have reviewed home medications with patient personally      Allergies: No Known Allergies    Social History:     Marital Status: /Civil Union   Occupation:  Retired sewing  Patient Pre-hospital Living Situation:  Home with   Patient Pre-hospital Level of Mobility:  Independent  Patient Pre-hospital Diet Restrictions:  None  Substance Use History:   Social History     Substance and Sexual Activity   Alcohol Use Never    Frequency: Never     Social History     Tobacco Use   Smoking Status Former Smoker   Smokeless Tobacco Never Used     Social History     Substance and Sexual Activity   Drug Use Never       Family History:    History reviewed  No pertinent family history  Physical Exam:     Vitals:   Blood Pressure: (!) 199/96 (02/03/20 0505)  Pulse: 77 (02/03/20 0505)  Temperature: 97 6 °F (36 4 °C) (02/03/20 0200)  Temp Source: Oral (02/03/20 0200)  Respirations: 18 (02/03/20 0350)  Height: 5' 7" (170 2 cm) (02/03/20 0156)  Weight - Scale: 68 8 kg (151 lb 10 8 oz) (02/03/20 0200)  SpO2: 96 % (02/03/20 0350)    Physical Exam   Constitutional: She is oriented to person, place, and time  She appears well-developed and well-nourished  HENT:   Head: Normocephalic and atraumatic  Mouth/Throat: Oropharynx is clear and moist    Eyes: Pupils are equal, round, and reactive to light  EOM are normal    Neck: Normal range of motion  Neck supple  Cardiovascular: Normal rate, regular rhythm, normal heart sounds and intact distal pulses  Exam reveals no gallop and no friction rub  No murmur heard  Pulmonary/Chest: Effort normal  No respiratory distress  She has wheezes  She has no rales (wheezes in bases posteriorly)  Abdominal: Soft  Bowel sounds are normal  She exhibits mass  She exhibits no distension  There is no tenderness  There is no guarding  Soft reducible ventral hernia   Musculoskeletal: Normal range of motion  She exhibits no edema, tenderness or deformity  Neurological: She is alert and oriented to person, place, and time  Skin: Skin is warm and dry   Capillary refill takes less than 2 seconds  Superficial abrasions to bilateral knees   Nursing note and vitals reviewed  Additional Data:     Lab Results: I have personally reviewed pertinent reports  Results from last 7 days   Lab Units 02/03/20  0430   WBC Thousand/uL 10 07   HEMOGLOBIN g/dL 12 9   HEMATOCRIT % 41 2   PLATELETS Thousands/uL 238   NEUTROS PCT % 75   LYMPHS PCT % 15   MONOS PCT % 9   EOS PCT % 1     Results from last 7 days   Lab Units 02/03/20  0430 02/02/20  1916   SODIUM mmol/L 140 140   POTASSIUM mmol/L 3 7 3 7   CHLORIDE mmol/L 104 101   CO2 mmol/L 27 28   BUN mg/dL 14 19   CREATININE mg/dL 0 62 0 81   ANION GAP mmol/L 9 11   CALCIUM mg/dL 8 0* 9 0   ALBUMIN g/dL  --  3 6   TOTAL BILIRUBIN mg/dL  --  0 83   ALK PHOS U/L  --  96   ALT U/L  --  21   AST U/L  --  21   GLUCOSE RANDOM mg/dL 148* 142*         Results from last 7 days   Lab Units 02/02/20  1909   POC GLUCOSE mg/dl 136         Results from last 7 days   Lab Units 02/02/20  1919   LACTIC ACID mmol/L 1 6       Imaging: I have personally reviewed pertinent reports  and I have personally reviewed pertinent films in PACS    CT head without contrast   Final Result by Michael Dao MD (02/02 2152)      No acute intracranial abnormality  Workstation performed: [id number]         XR chest 2 views   ED Interpretation by Nemesio Sandoval DO (02/02 2134)   bibasilar atx vs infiltrate      XR knee 4+ vw left injury   ED Interpretation by Nemesio Snadoval DO (02/02 2135)   No acute findings      XR knee 4+ vw right injury   ED Interpretation by Nemesio Sandoval DO (02/02 2135)   No acute findings      XR shoulder 2+ views RIGHT   ED Interpretation by Nemesio Sandoval DO (02/02 2135)   No acute findings          EKG, Pathology, and Other Studies Reviewed on Admission:   · EKG:  Normal sinus rhythm rate of 76  No ectopy or signs of ischemia or infarct      Allscripts / Epic Records Reviewed: Yes     ** Please Note: This note has been constructed using a voice recognition system   **

## 2020-02-03 NOTE — ASSESSMENT & PLAN NOTE
· BP initially elevated, treated with metoprolol in the ER  · Patient says she did not take her meds today  · Continue lisinopril and metoprolol  · Monitor blood pressure  · Use IV Lopressor 5 mg p r n

## 2020-02-03 NOTE — ASSESSMENT & PLAN NOTE
· Chest x-ray:  Left pleural effusion, right lung opacity  Radiology read is pending    · WBC 15  · Flu negative  · Lactic acid 1 6  · Blood cultures pending  · Received azithromycin and ceftriaxone in the ER-continue per CAP pathway  · Check procalcitonin  · Check strep pneumoniae and Legionella antigen  · Respiratory and oxygen protocol

## 2020-02-03 NOTE — ASSESSMENT & PLAN NOTE
· BP initially elevated, treated with metoprolol in the ER  · Patient says she did not take her meds today  · Discontinue lisinopril metoprolol, allow for permissive hypertension pending further evaluation  · Monitor blood pressure

## 2020-02-03 NOTE — UTILIZATION REVIEW
Notification of Inpatient Admission/Inpatient Authorization Request   This is a Notification of Inpatient Admission for [address]  Be advised that this patient was admitted to our facility under Inpatient Status  Contact Acacia Mae at [phone] for additional admission information  [facility] [address] [facility] DEPT  DEDICATED [phone]  Patient Name:   Evangelist Foster   YOB: 1940       [address]   [address]:   [address]  Tax ID: [id number]  NPI: [id number] Attending Provider/NPI: Gonzalo Toney [[id number]]   Attending Physician:  [facility] Brandt [facility]  [facility] Practioner ID- [id number]  [address] [place] [address]  Phone 1: [phone]  Fax: [phone]   Place of Service Code: 24     Place of Service Name:  [address] [place] [address]   Start Date: 2/2/20 2222     Discharge Date & Time: No discharge date for patient encounter  Type of Admission: Inpatient Status Discharge Disposition (if discharged): Final discharge disposition not confirmed   Patient Diagnoses: Numbness [R20 0]  Generalized weakness [R53 1]  Ambulatory dysfunction [R26 2]  Pneumonia of both lower lobes due to infectious organism [facility]) [J18 1]     Orders: Admission Orders (From admission, onward)     Ordered        02/02/20 2222  Inpatient Admission  Once                    Assigned Utilization Review Contact: Acacia Mae  Utilization   Network Utilization Review Department  Phone: [phone]; Fax [phone]  Email: [email or web address]   ATTENTION PAYERS: Please call the assigned Utilization  directly with any questions or concerns ALL voicemails in the department are confidential  Send all requests for admission clinical reviews, approved or denied determinations and any other requests to dedicated fax number belonging to the campus where the patient is receiving treatment

## 2020-02-03 NOTE — ASSESSMENT & PLAN NOTE
· Multiple falls at home due to generalized weakness, suspected acute CVA  · CT head: negative for acute intracranial abnormality initiate stroke pathway    · Right shoulder x-ray:  No acute findings  Radiology read is pending  · Right knee x-ray: No acute findings  Radiology read is pending  · Left knee x-ray: No acute findings    Radiology read is pending

## 2020-02-03 NOTE — ASSESSMENT & PLAN NOTE
Patient presented with generalized weakness, recurrent falls, patient worsening right-sided weakness today    Suspected acute CVA, likely left MCA territory, start stroke pathway, will obtain Neurology consult if MRI is positive for acute CVA  Generalized weakness was initially thought secondary to pneumonia, pneumonia has been ruled out

## 2020-02-03 NOTE — PROGRESS NOTES
Chart reviewed this AM  Admit with multiple falls:  ? PNA  Initial assessment was done this AM  Noted at this time patient is being evaluated for stroke, initial CT was negative  CM met with patient at the bedside,baseline information  was obtained  CM discussed the role of CM in helping the patient develop a discharge plan and assist the patient in carry out their plan  02/03/20 [address]   Patient Information   Mental Status Alert   Primary Caregiver Self   Activities of Daily Living Prior to Admission   Functional Status Independent   Assistive Device No device needed   Living Arrangement House;Lives with someone   [address] [facility]: Family     Patient has identified: her spouse as her  primary   No POA: Pt verbalized her spouse would be the person assisting with decisions with making if need be  No advance directive:     Patient has 3 adult children: [facility] and [facility]    PCP:     Dr Cosmo River or Dr Dora Colin  Pt has a prescription plan and uses [facility]  Medications are affordable  Patient is not a :    Pt denies SA/MH history  No prior MH services  House set up: apartment, no steps  Functional level PTA: I/PTA  - patient stated she does take care of her own apartment, her spouse and her share cooking  DME use:  none  Prior use of Home Care or STR: none  Transportation:  drives, at this time patient does not anticipate an issue with transportation home  Community Resources: no    This Am patient's plan for dc was to return home  Aware that stroke work up is underway- will follow functional status  CM attempted to call son, to verify baseline information, however he did not answer      CM will continue to follow-- Patient may need rehab will be dependent on functional status

## 2020-02-04 ENCOUNTER — APPOINTMENT (INPATIENT)
Dept: RADIOLOGY | Facility: HOSPITAL | Age: 80
DRG: 065 | End: 2020-02-04
Payer: COMMERCIAL

## 2020-02-04 ENCOUNTER — APPOINTMENT (INPATIENT)
Dept: MRI IMAGING | Facility: HOSPITAL | Age: 80
DRG: 065 | End: 2020-02-04
Payer: COMMERCIAL

## 2020-02-04 ENCOUNTER — APPOINTMENT (INPATIENT)
Dept: CT IMAGING | Facility: HOSPITAL | Age: 80
DRG: 065 | End: 2020-02-04
Payer: COMMERCIAL

## 2020-02-04 PROBLEM — R06.03 RESPIRATORY DISTRESS: Status: ACTIVE | Noted: 2020-02-04

## 2020-02-04 PROBLEM — I63.512 ACUTE ISCHEMIC CEREBROVASCULAR ACCIDENT (CVA) INVOLVING LEFT MIDDLE CEREBRAL ARTERY TERRITORY (HCC): Status: ACTIVE | Noted: 2020-02-04

## 2020-02-04 LAB
ALBUMIN SERPL BCP-MCNC: 3.2 G/DL (ref 3.5–5)
ALP SERPL-CCNC: 85 U/L (ref 46–116)
ALT SERPL W P-5'-P-CCNC: 21 U/L (ref 12–78)
ANION GAP SERPL CALCULATED.3IONS-SCNC: 6 MMOL/L (ref 4–13)
AST SERPL W P-5'-P-CCNC: 30 U/L (ref 5–45)
ATRIAL RATE: 76 BPM
BASOPHILS # BLD AUTO: 0.02 THOUSANDS/ΜL (ref 0–0.1)
BASOPHILS NFR BLD AUTO: 0 % (ref 0–1)
BILIRUB SERPL-MCNC: 0.69 MG/DL (ref 0.2–1)
BUN SERPL-MCNC: 13 MG/DL (ref 5–25)
CALCIUM SERPL-MCNC: 8.6 MG/DL (ref 8.3–10.1)
CHLORIDE SERPL-SCNC: 104 MMOL/L (ref 100–108)
CHOLEST SERPL-MCNC: 197 MG/DL (ref 50–200)
CO2 SERPL-SCNC: 29 MMOL/L (ref 21–32)
CREAT SERPL-MCNC: 0.7 MG/DL (ref 0.6–1.3)
EOSINOPHIL # BLD AUTO: 0.12 THOUSAND/ΜL (ref 0–0.61)
EOSINOPHIL NFR BLD AUTO: 1 % (ref 0–6)
ERYTHROCYTE [DISTWIDTH] IN BLOOD BY AUTOMATED COUNT: 13.6 % (ref 11.6–15.1)
EST. AVERAGE GLUCOSE BLD GHB EST-MCNC: 177 MG/DL
GFR SERPL CREATININE-BSD FRML MDRD: 84 ML/MIN/1.73SQ M
GLUCOSE SERPL-MCNC: 191 MG/DL (ref 65–140)
HBA1C MFR BLD: 7.8 % (ref 4.2–6.3)
HCT VFR BLD AUTO: 43.7 % (ref 34.8–46.1)
HDLC SERPL-MCNC: 33 MG/DL
HGB BLD-MCNC: 14.2 G/DL (ref 11.5–15.4)
IMM GRANULOCYTES # BLD AUTO: 0.03 THOUSAND/UL (ref 0–0.2)
IMM GRANULOCYTES NFR BLD AUTO: 0 % (ref 0–2)
LDLC SERPL CALC-MCNC: 138 MG/DL (ref 0–100)
LYMPHOCYTES # BLD AUTO: 1.44 THOUSANDS/ΜL (ref 0.6–4.47)
LYMPHOCYTES NFR BLD AUTO: 15 % (ref 14–44)
MAGNESIUM SERPL-MCNC: 2 MG/DL (ref 1.6–2.6)
MCH RBC QN AUTO: 26.1 PG (ref 26.8–34.3)
MCHC RBC AUTO-ENTMCNC: 32.5 G/DL (ref 31.4–37.4)
MCV RBC AUTO: 80 FL (ref 82–98)
MONOCYTES # BLD AUTO: 0.91 THOUSAND/ΜL (ref 0.17–1.22)
MONOCYTES NFR BLD AUTO: 10 % (ref 4–12)
NEUTROPHILS # BLD AUTO: 6.96 THOUSANDS/ΜL (ref 1.85–7.62)
NEUTS SEG NFR BLD AUTO: 74 % (ref 43–75)
NRBC BLD AUTO-RTO: 0 /100 WBCS
P AXIS: 77 DEGREES
PHOSPHATE SERPL-MCNC: 2.6 MG/DL (ref 2.3–4.1)
PLATELET # BLD AUTO: 238 THOUSANDS/UL (ref 149–390)
PMV BLD AUTO: 10.6 FL (ref 8.9–12.7)
POTASSIUM SERPL-SCNC: 4.1 MMOL/L (ref 3.5–5.3)
PR INTERVAL: 150 MS
PROCALCITONIN SERPL-MCNC: <0.05 NG/ML
PROT SERPL-MCNC: 7 G/DL (ref 6.4–8.2)
QRS AXIS: 72 DEGREES
QRSD INTERVAL: 78 MS
QT INTERVAL: 394 MS
QTC INTERVAL: 443 MS
RBC # BLD AUTO: 5.45 MILLION/UL (ref 3.81–5.12)
SODIUM SERPL-SCNC: 139 MMOL/L (ref 136–145)
T WAVE AXIS: 66 DEGREES
TRIGL SERPL-MCNC: 129 MG/DL
VENTRICULAR RATE: 76 BPM
WBC # BLD AUTO: 9.48 THOUSAND/UL (ref 4.31–10.16)

## 2020-02-04 PROCEDURE — 99233 SBSQ HOSP IP/OBS HIGH 50: CPT | Performed by: INTERNAL MEDICINE

## 2020-02-04 PROCEDURE — 71045 X-RAY EXAM CHEST 1 VIEW: CPT

## 2020-02-04 PROCEDURE — 83036 HEMOGLOBIN GLYCOSYLATED A1C: CPT | Performed by: INTERNAL MEDICINE

## 2020-02-04 PROCEDURE — 70498 CT ANGIOGRAPHY NECK: CPT

## 2020-02-04 PROCEDURE — 94760 N-INVAS EAR/PLS OXIMETRY 1: CPT

## 2020-02-04 PROCEDURE — 93010 ELECTROCARDIOGRAM REPORT: CPT | Performed by: INTERNAL MEDICINE

## 2020-02-04 PROCEDURE — 80061 LIPID PANEL: CPT | Performed by: INTERNAL MEDICINE

## 2020-02-04 PROCEDURE — 84100 ASSAY OF PHOSPHORUS: CPT | Performed by: INTERNAL MEDICINE

## 2020-02-04 PROCEDURE — 85025 COMPLETE CBC W/AUTO DIFF WBC: CPT | Performed by: INTERNAL MEDICINE

## 2020-02-04 PROCEDURE — 92610 EVALUATE SWALLOWING FUNCTION: CPT

## 2020-02-04 PROCEDURE — 83735 ASSAY OF MAGNESIUM: CPT | Performed by: INTERNAL MEDICINE

## 2020-02-04 PROCEDURE — 70551 MRI BRAIN STEM W/O DYE: CPT

## 2020-02-04 PROCEDURE — 70496 CT ANGIOGRAPHY HEAD: CPT

## 2020-02-04 PROCEDURE — 84145 PROCALCITONIN (PCT): CPT | Performed by: INTERNAL MEDICINE

## 2020-02-04 PROCEDURE — 80053 COMPREHEN METABOLIC PANEL: CPT | Performed by: INTERNAL MEDICINE

## 2020-02-04 PROCEDURE — G0427 INPT/ED TELECONSULT70: HCPCS | Performed by: PSYCHIATRY & NEUROLOGY

## 2020-02-04 RX ORDER — SODIUM CHLORIDE 9 MG/ML
75 INJECTION, SOLUTION INTRAVENOUS CONTINUOUS
Status: DISCONTINUED | OUTPATIENT
Start: 2020-02-04 | End: 2020-02-06

## 2020-02-04 RX ORDER — METOPROLOL TARTRATE 5 MG/5ML
5 INJECTION INTRAVENOUS EVERY 6 HOURS
Status: DISCONTINUED | OUTPATIENT
Start: 2020-02-04 | End: 2020-02-05

## 2020-02-04 RX ORDER — ALBUTEROL SULFATE 2.5 MG/3ML
2.5 SOLUTION RESPIRATORY (INHALATION) EVERY 6 HOURS PRN
Status: DISCONTINUED | OUTPATIENT
Start: 2020-02-04 | End: 2020-02-06

## 2020-02-04 RX ADMIN — ENOXAPARIN SODIUM 40 MG: 40 INJECTION SUBCUTANEOUS at 08:46

## 2020-02-04 RX ADMIN — PANTOPRAZOLE SODIUM 40 MG: 40 TABLET, DELAYED RELEASE ORAL at 08:46

## 2020-02-04 RX ADMIN — HYDRALAZINE HYDROCHLORIDE 10 MG: 20 INJECTION INTRAMUSCULAR; INTRAVENOUS at 05:14

## 2020-02-04 RX ADMIN — Medication 1 TABLET: at 08:46

## 2020-02-04 RX ADMIN — ASPIRIN 81 MG 81 MG: 81 TABLET ORAL at 08:46

## 2020-02-04 RX ADMIN — IOHEXOL 85 ML: 350 INJECTION, SOLUTION INTRAVENOUS at 09:19

## 2020-02-04 RX ADMIN — PSYLLIUM HUSK 1 PACKET: 3.4 POWDER ORAL at 08:47

## 2020-02-04 RX ADMIN — GUAIFENESIN 600 MG: 600 TABLET ORAL at 08:46

## 2020-02-04 RX ADMIN — SODIUM CHLORIDE 1000 ML: 0.9 INJECTION, SOLUTION INTRAVENOUS at 08:46

## 2020-02-04 RX ADMIN — METOPROLOL TARTRATE 5 MG: 5 INJECTION INTRAVENOUS at 21:26

## 2020-02-04 RX ADMIN — SODIUM CHLORIDE 75 ML/HR: 0.9 INJECTION, SOLUTION INTRAVENOUS at 21:26

## 2020-02-04 NOTE — ASSESSMENT & PLAN NOTE
· Continue with permissive hypertension, will discuss further with Neurology    Hold home meds      · Monitor blood pressure

## 2020-02-04 NOTE — PHYSICAL THERAPY NOTE
PHYSICAL THERAPY CANCELLATION NOTE          Patient Name: Jackson John  [id number] Date: 2/4/2020     Received order for PT consult  Chart reviewed  Attempted to see patient twice this date  First attempt at 8 AM with RN, [facility], [facility] requesting therapy hold due to increased wheezing as well as pending telemedicine neurology consult  Attempted to see patient a second time, however RN continues to request holding therapy this date associated with decreasing medical stability with patient requiring further medical management  Will hold PT services this date and see patient as medically appropriate at a later time       Ayush Hoff, PT,DPT

## 2020-02-04 NOTE — ASSESSMENT & PLAN NOTE
Patient presented with generalized weakness, recurrent falls, patient has had progressively diminished neurologic exam since admission  Generalized weakness was initially thought secondary to pneumonia, pneumonia has been ruled out

## 2020-02-04 NOTE — PLAN OF CARE
Problem: Prexisting or High Potential for Compromised Skin Integrity  Goal: Skin integrity is maintained or improved  Description  INTERVENTIONS:  - Identify patients at risk for skin breakdown  - Assess and monitor skin integrity  - Assess and monitor nutrition and hydration status  - Monitor labs   - Assess for incontinence   - Turn and reposition patient  - Assist with mobility/ambulation  - Relieve pressure over bony prominences  - Avoid friction and shearing  - Provide appropriate hygiene as needed including keeping skin clean and dry  - Evaluate need for skin moisturizer/barrier cream  - Collaborate with interdisciplinary team   - Patient/family teaching  - Consider wound care consult   Outcome: Progressing     Problem: Potential for Falls  Goal: Patient will remain free of falls  Description  INTERVENTIONS:  - Assess patient frequently for physical needs  -  Identify cognitive and physical deficits and behaviors that affect risk of falls    -  Kennard fall precautions as indicated by assessment   - Educate patient/family on patient safety including physical limitations  - Instruct patient to call for assistance with activity based on assessment  - Modify environment to reduce risk of injury  - Consider OT/PT consult to assist with strengthening/mobility  Outcome: Progressing

## 2020-02-04 NOTE — QUICK NOTE
Patient with acute CVA showed clinical worsening overnight  Repeat MRI showed interval extension of the nonhemorrhagic left BUZZ territory infarction involving the parasagittal left posterior frontal lobe no extending to the cingulate gyrus  Discussed the finding with the neurologist on-call Dr Simona Martinez and after reviewing the MRI images he suggested this is probably progression of the original infarct and may not warrant initiation of IV heparin  Neurology recommended to continue aspirin and statin for now and proceed for LUPE, loop recorder  Discussed the case with cardiology team for possible LUPE

## 2020-02-04 NOTE — SPEECH THERAPY NOTE
Speech-Language Pathology Bedside Swallow Evaluation      Patient Name: Bettyjane Osgood    Today's Date: 2/3/2020     Problem List  Principal Problem:    Generalized weakness  Active Problems:    Essential hypertension    Pneumonia due to infectious organism    Fall    Gastroesophageal reflux disease without esophagitis      Past Medical History  Past Medical History:   Diagnosis Date    GERD (gastroesophageal reflux disease)     Hypertension        Past Surgical History  Past Surgical History:   Procedure Laterality Date    CHOLECYSTECTOMY         Summary   Pt presented with functional appearing oral and pharyngeal stage swallowing skills with materials administered today  Bolus hold with liquids however is habit vs dysfunction  Pt aware and able to transfer immediately when focusing and monitoring  No s/s of aspiration with regular and thin liquids  Speech/language and cognition screen completed  Passed supporting no need of further evaluation  Dysphagia evaluation only  Risk/s for Aspiration: n/a     Recommended Diet: regular diet and thin liquids   Recommended Form of Meds: whole with liquid   Aspiration precautions and swallowing strategies: upright posture, slow rate of feeding, small bites/sips and alternating bites and sips  Other Recommendations/Considerations: Completed speech/language and cognitive screen  Oriented to person, place, time, and situation  Naming of common/uncommon items 5/5, sentence completion 5/5, opposites 5/5, oral reading of sentences 3/3, following of 1 step commands 2/2 and 2 step commands 4/4  Picture description clear, intelligible and accurate/topic oriented  Slight delayed processing but functional      Current Medical Status  Bettyjane Osgood is a 70 y o  female with history of essential hypertension and GERD who presents with a one day history of cough, dizziness, and weakness resulting in several falls    She did not hit her head, but she says her legs are very weak, and gave out twice  She was able to get up by herself the first two times, but the 3rd time her  had to help her get up off the floor and pulled her right shoulder, she is experiencing pain  She says her cough is productive of sputum, she did not know the color  The dizziness is more of a lightheadedness, which is worsened when she stands up  The weakness is bilateral and non-focal   She denies fever or chills, chest pain or shortness of breath, nausea or vomiting, diarrhea, hematemesis  She does say that she had a decreased appetite and did not take her medication today  Current Precautions:  Fall    Past medical history: Please see H&P for details    Special Studies:  MRI 2/3/20 Multiple superficial cortical infarct in the parasagittal left frontal, parietal region and in the precentra,postcentral gyrus,  in the distribution of the left anterior cerebral artery, middle cerebral artery, probably embolic  No acute hemorrhage seen  Multiple hypointense foci  foot hemosiderin deposition in the subcortical region in the parietal region, temporal region may related to hypertensive microangiopathy other less likely etiology could include amyloid angiopathy       Social/Education/Vocational Hx:  Pt lives with    drives  Swallow Information   Current Risks for Dysphagia & Aspiration: change in medical condition  Current Symptoms/Concerns: nsg reporting bolus hold of liquids  Current Diet: regular diet and thin liquids   Baseline Diet: regular diet and thin liquids      Baseline Assessment   Behavior/Cognition: alert  Speech/Language Status: able to participate in conversation  Patient Positioning: upright in bed  Pain Status/Interventions/Response to Interventions: No report of or nonverbal indications of pain         Swallow Mechanism Exam  Facial: symmetrical  Labial: WFL  Lingual: WFL  Velum: symmetrical  Mandible: adequate ROM  Dentition: adequate  Vocal quality:clear/adequate   Volitional Cough: strong/productive   Respiratory Status: on RA        Consistencies Assessed and Performance   Consistencies Administered: thin liquids, puree, mechanical soft solids, soft solids and hard solids  Materials administered included: puree, crackers, cookies, sandwich, pretzels, pepsi cup and straw    Oral Stage: WFL  Mastication was adequate with the materials administered today  Bolus formation and transfer were functional with no significant oral residue noted  Intermittent bolus hold with liquids however per habit  Pt able to initiate transfer and addressing self prompting  No overt s/s reduced oral control  Pharyngeal Stage: WFL  Swallow Mechanics:  Swallowing initiation appeared prompt  Laryngeal rise was palpated and judged to be within functional limits  No coughing, throat clearing, change in vocal quality or respiratory status noted today  Esophageal Concerns: none reported    Strategies and Efficacy: quick transfer and swallow of liquids    Summary and Recommendations (see above)    Results Reviewed with: patient and RN     Caregiver Education: Reviewed results from evaluation with PT and RN  Discussed recommendations and verbalized understanding  Treatment Recommended: dysphagia evaluation only        Sheng Jade [facility], [facility]-SLP

## 2020-02-04 NOTE — ASSESSMENT & PLAN NOTE
· Multiple falls at home due to generalized weakness, acute CVA      PT OT, patient is going to likely require acute rehab

## 2020-02-04 NOTE — CASE MANAGEMENT
Discussed in case this AM  MRI from yesterday showed scattered infarcts in left MCA and PCA territories  Neurology consult was planned for today  CM called this AM and spoke to son Annika Pulliam  he was aware of the stroke and that patient will benefit with Acute Rehab Program  Tigre Tejeda was talking with the MD yesterday and at this time he would like a referral made to [address] [place] [address]  He is aware that Suzie Reno is needed as patient has [employer]  Tigre Tejeda was aware that I was going to place post acute providers information on Acute Rehab in the room     A post acute care recommendation was made by your care team for Acute Rehab  Discussed [place] of Choice with caregiver  List of facilities given to caregiver via in person  Son was  aware the list is custom filtered for them by zip code location and that [facility] post acute providers are designated  Choice per son is [facility], will hold off making the referral until PT/OT evaluations are completed

## 2020-02-04 NOTE — CONSULTS
Consult received for ischemic stroke  Pt is nonverbal which is new today, RN suspects it is an emotional response to receiving news of her stroke  Attempted to perform nutrition interview, pt would look away from RD when question was asked  Will reattempt diet ed and full assessment at follow up

## 2020-02-04 NOTE — ASSESSMENT & PLAN NOTE
· Negative procalcitonin x2, pneumonia has been ruled out, discontinue antibiotics      Repeat procalcitonin x1 today, check chest x-ray

## 2020-02-04 NOTE — OCCUPATIONAL THERAPY NOTE
Occupational Therapy Cancellation Note      Patient Name: Hiawatha Romberg  Today's Date: 2/4/2020      OT orders received  Chart review completed  Therapist attempting to evaluate Pt at 10am, SARAH Duvall requesting therapy to hold at this time d/t pt demonstrating increased wheezing and neuro tele-consult taking place  Therapist returned at a later time, RN continuing to request that therapy hold d/t unstable medical status at this time  OT to hold services at this time  Will re-approach at a later time as schedule allows and as medically appropriate      Herber Sauceda OTR/L

## 2020-02-04 NOTE — TELEMEDICINE
TeleConsultation - Neurology   Eleonora Llanos 70 y o  female MRN: [id number]   Encounter: [id number]      REQUIRED DOCUMENTATION:     1  This service was provided via Telemedicine  2  Provider located at home  3  TeleMed provider: Lali Rasmussen MD   4  Identify all parties in room with patient during tele consult:  Son, Gonzalo Nelson  5  After connecting through [employer], patient was identified by name and date of birth and assistant checked wristband  Patient was then informed that this was a Telemedicine visit and that the exam was being conducted confidentially over secure lines  My office door was closed  No one else was in the room  Patient acknowledged consent and understanding of privacy and security of the Telemedicine visit, and gave us permission to have the assistant stay in the room in order to assist with the history and to conduct the exam   I informed the patient that I have reviewed their record in Epic and presented the opportunity for them to ask any questions regarding the visit today  The patient agreed to participate  Assessment/Plan   Assessment:  Right sided weakness, and expressive aphasia:  MRI from yesterday showed scattered infarcts in left MCA and PCA territories  Overnight, patient worsened and had more aphasia  This occurred concomitantly with a significant drop in BP after a relatively small dose of Hydralazine  Whether this is a worsening of the prior infarcts, or an evolution of new ones is yet unknown  We obtained stat CTA which did not show major vessel stenosis or occlusion    Plan:  Continue with telemetry  Pending report of TTE, if negative she will need LUPE  Obtain repeat MRI brain  If we see new infarcts, we will start Heparin drip on stroke protocol as it will indicate an ongoing embolization phenomenon    PT/OT and speech therapy    History of Present Illness     Reason for Consult / Principal Problem: Weakness  Hx and PE limited by: none  HPI: Eleonora Llanos is a 70 y o  female who presents from home after falls  Patient developed headache on Saturday  On Sunday morning, she fell repeatedly while trying to reach the bathroom  Although she did not indicate a unilateral weakness, it was thought that she had right sided weakness  MRI brain was done and showed infarcts as mentioned above  Overnight, patient was extremely hypertensive so she was given one dose of Hydralazine which dropped BP to 90 systolic  This morning, she was seen to have new aphasia and worsening weakness  Patient has no headache  She has not had any recent illness  Family history is unremarkable  She has no weight loss or other constitutional symptoms  Inpatient consult to Neurology  Consult performed by: Derik Steinberg MD  Consult ordered by: Lauri Campbell DO          Review of Systems  Complete ROS was done and is negative other than what is mentioned in HPI    Historical Information   Past Medical History:   Diagnosis Date    GERD (gastroesophageal reflux disease)     Hypertension      Past Surgical History:   Procedure Laterality Date    CHOLECYSTECTOMY       Social History   Social History     Substance and Sexual Activity   Alcohol Use Never    Frequency: Never     Social History     Substance and Sexual Activity   Drug Use Never     Social History     Tobacco Use   Smoking Status Former Smoker   Smokeless Tobacco Never Used     Family History: non-contributory    Review of previous medical records was completed  Meds/Allergies   PTA meds:   Prior to Admission Medications   Prescriptions Last Dose Informant Patient Reported? Taking?    Calcium Carb-Cholecalciferol 600-200 MG-UNIT TABS 2/3/2020 at Unknown time  Yes Yes   Sig: Take 1 tablet by mouth 2 (two) times a day   metoprolol succinate (TOPROL-XL) 50 mg 24 hr tablet 2/3/2020 at Unknown time  Yes Yes   Sig: Take 50 mg by mouth daily   pantoprazole (PROTONIX) 40 mg tablet 2/3/2020 at Unknown time  Yes Yes   Sig: Take 40 mg by mouth daily   psyllium (METAMUCIL) 0 52 g capsule 2/3/2020 at Unknown time  Yes Yes   Sig: Take 0 52 g by mouth daily   simvastatin (ZOCOR) 20 mg tablet 2/2/2020 at Unknown time  Yes Yes   Sig: Take 20 mg by mouth daily with dinner      Facility-Administered Medications: None       No Known Allergies    Objective   Vitals:Blood pressure (!) 197/90, pulse 104, temperature 97 9 °F (36 6 °C), temperature source Oral, resp  rate (!) 32, height 5' 7" (1 702 m), weight 68 8 kg (151 lb 10 8 oz), SpO2 96 %  ,Body mass index is 23 76 kg/m²  Intake/Output Summary (Last 24 hours) at 2/4/2020 1049  Last data filed at 2/3/2020 1600  Gross per 24 hour   Intake    Output 400 ml   Net -400 ml       Invasive Devices: Invasive Devices     Peripheral Intravenous Line            Peripheral IV 02/02/20 Left Antecubital 1 day          Drain            External Urinary Catheter 1 day                Physical Exam NAD  Skin: no seen lesions  HEENT: ATNC  Chest: breathing comfortably on room air  GI: no apparent distension  Neurologic Exam  Alert and oriented  Speech is mildly dysarthric, and has expressive aphasia  No neglect  Right facial weakness  EOMI  Visual field is full  No antigravity strength in right arm or leg  Sensation is intact   No dysmetria  NIHSS (2 face, 4 R arm, 4 R leg, 1 speech and 1 language)= 12      Lab Results:   CBC:   Results from last 7 days   Lab Units 02/04/20 0457 02/03/20  0430 02/02/20 1916   WBC Thousand/uL 9 48 10 07 15 08*   RBC Million/uL 5 45* 5 11 5 72*   HEMOGLOBIN g/dL 14 2 12 9 14 7   HEMATOCRIT % 43 7 41 2 46 2*   MCV fL 80* 81* 81*   PLATELETS Thousands/uL 238 238 277   , BMP/CMP:   Results from last 7 days   Lab Units 02/04/20 0457 02/03/20  0430 02/02/20 1916   SODIUM mmol/L 139 140 140   POTASSIUM mmol/L 4 1 3 7 3 7   CHLORIDE mmol/L 104 104 101   CO2 mmol/L 29 27 28   BUN mg/dL 13 14 19   CREATININE mg/dL 0 70 0 62 0 81   CALCIUM mg/dL 8 6 8 0* 9 0   AST U/L 30  --  21   ALT U/L 21  --  21   ALK PHOS U/L 85 --  96   EGFR ml/min/1 73sq m 84 87 70   , HgBA1C:   Results from last 7 days   Lab Units 02/04/20  0457   HEMOGLOBIN A1C % 7 8*   , Lipid Profile:   Results from last 7 days   Lab Units 02/04/20  0457   HDL mg/dL 33*   LDL CALC mg/dL 138*   TRIGLYCERIDES mg/dL 129     Imaging Studies: I have personally reviewed pertinent films in PACS  EKG, Pathology, and Other Studies: I have personally reviewed pertinent reports      VTE Prophylaxis: Sequential compression device Jero Parker     Code Status: Level 1 - Full Code  Advance Directive and Living Will:      Power of :    POLST:      Counseling / Coordination of Care  N/A

## 2020-02-04 NOTE — ASSESSMENT & PLAN NOTE
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  Patient has developed worsening expressive aphasia overnight, possibly related to acute blood pressure dropped, expressive aphasia secondary to acute CVA  Discontinue IV hydralazine  Patient will be made NPO pending repeat speech eval due to worsening status  PT OT consult pending  Follow-up with neurology recommendations

## 2020-02-04 NOTE — PROGRESS NOTES
Progress Note - Evangelist Foster 1940, 70 y o  female MRN: [id number]    Unit/Bed#: -01 Encounter: [id number]    Primary Care Provider: Ramya Goel DO   Date and time admitted to hospital: 2/2/2020  6:44 PM        * Acute ischemic cerebrovascular accident (CVA) involving left middle cerebral artery territory [facility])  Assessment & Plan  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  Patient has developed worsening expressive aphasia overnight, possibly related to acute blood pressure dropped, expressive aphasia secondary to acute CVA  Discontinue IV hydralazine  Patient will be made NPO pending repeat speech eval due to worsening status  PT OT consult pending  Follow-up with neurology recommendations  Generalized weakness  Assessment & Plan  Patient presented with generalized weakness, recurrent falls, patient has had progressively diminished neurologic exam since admission  Generalized weakness was initially thought secondary to pneumonia, pneumonia has been ruled out  Fall  Assessment & Plan  · Multiple falls at home due to generalized weakness, acute CVA      PT OT, patient is going to likely require acute rehab  Respiratory distress  Assessment & Plan  Check stat portable chest x-ray oxygen as needed to maintain sats greater than 92%, NPO pending repeat speech eval    Essential hypertension  Assessment & Plan  · Continue with permissive hypertension, will discuss further with Neurology    Hold home meds  · Monitor blood pressure      Pneumonia due to infectious organism  Assessment & Plan  · Negative procalcitonin x2, pneumonia has been ruled out, discontinue antibiotics      Repeat procalcitonin x1 today, check chest x-ray    Gastroesophageal reflux disease without esophagitis  Assessment & Plan  · Currently controlled  · Continue Protonix    VTE Pharmacologic Prophylaxis:   Pharmacologic: Enoxaparin (Lovenox)  Mechanical VTE Prophylaxis in Place: Yes    Patient Centered Rounds: I have performed bedside rounds with nursing staff today  Discussions with Specialists or Other Care Team Provider:  Discussed with neurology    Education and Discussions with Family / Patient: Will call the patient's son to update him with plan of care, plan of care discussed in detail at bedside on 2/3/2020 with patient's son,  and daughter-in-law    Time Spent for Care: 1 hour  More than 50% of total time spent on counseling and coordination of care as described above  Current Length of Stay: 2 day(s)    Current Patient Status: Inpatient   Certification Statement: The patient will continue to require additional inpatient hospital stay due to Acute CVA with worsening neurologic status      Discharge plan to be determined with regards to exact date, case management has sent referrals to acute rehab facilities  Code Status: Level 1 - Full Code      Subjective:   Patient is awake and alert, she is nonverbal today per my exam, follows commands  Objective:     Vitals:   Temp (24hrs), Av 6 °F (37 °C), Min:97 9 °F (36 6 °C), Max:99 1 °F (37 3 °C)    Temp:  [97 9 °F (36 6 °C)-99 1 °F (37 3 °C)] 97 9 °F (36 6 °C)  HR:  [] 104  Resp:  [18-53] 32  BP: ()/() 197/90  SpO2:  [93 %-96 %] 96 %  Body mass index is 23 76 kg/m²  Input and Output Summary (last 24 hours): Intake/Output Summary (Last 24 hours) at 2020 1007  Last data filed at 2/3/2020 1600  Gross per 24 hour   Intake    Output 400 ml   Net -400 ml       Physical Exam:     Physical Exam   Constitutional: She appears well-developed and well-nourished  No distress  HENT:   Head: Normocephalic and atraumatic  Right Ear: External ear normal    Left Ear: External ear normal    Right facial droop is present today   Eyes: Pupils are equal, round, and reactive to light  Right eye exhibits no discharge  Left eye exhibits no discharge  Cardiovascular: Normal rate, regular rhythm, normal heart sounds and intact distal pulses  Pulmonary/Chest: Effort normal and breath sounds normal  No stridor  No respiratory distress  Abdominal: Soft  Bowel sounds are normal  She exhibits no distension  There is no tenderness  Neurological: She is alert  Patient is nonverbal today, patient was able to speak fluently yesterday    Ongoing weakness in right upper and right lower extremities,  1/5 muscle strength present   Skin: Skin is warm and dry  She is not diaphoretic  No erythema  Nursing note and vitals reviewed  Additional Data:     Labs:    Results from last 7 days   Lab Units 02/04/20  0457   WBC Thousand/uL 9 48   HEMOGLOBIN g/dL 14 2   HEMATOCRIT % 43 7   PLATELETS Thousands/uL 238   NEUTROS PCT % 74   LYMPHS PCT % 15   MONOS PCT % 10   EOS PCT % 1     Results from last 7 days   Lab Units 02/04/20  0457   POTASSIUM mmol/L 4 1   CHLORIDE mmol/L 104   CO2 mmol/L 29   BUN mg/dL 13   CREATININE mg/dL 0 70   CALCIUM mg/dL 8 6   ALK PHOS U/L 85   ALT U/L 21   AST U/L 30           * I Have Reviewed All Lab Data Listed Above  * Additional Pertinent Lab Tests Reviewed: Latrice 66 Admission Reviewed    Imaging:    Imaging Reports Reviewed Today Include:  MRI of head   Impression:    Multiple superficial cortical infarct in the parasagittal left frontal, parietal region and in the precentra,postcentral gyrus,  in the distribution of the left anterior cerebral artery, middle cerebral artery, probably embolic    No acute hemorrhage seen    Multiple hypointense foci  foot hemosiderin deposition in the subcortical region in the parietal region, temporal region may related to hypertensive microangiopathy other less likely etiology could include amyloid angiopathy          Recent Cultures (last 7 days):     Results from last 7 days   Lab Units 02/02/20 2122 02/02/20 1924 02/02/20 1916   BLOOD CULTURE   --  No Growth at 24 hrs   No Growth at 24 hrs     LEGIONELLA URINARY ANTIGEN  Negative  --   --        Last 24 Hours Medication List:     Current Facility-Administered Medications:  acetaminophen 650 mg Oral Q6H PRN AJIT Hough   aspirin 81 mg Oral Daily Nicky Peter,    atorvastatin 40 mg Oral QPM Nicky Peter,    calcium carbonate-vitamin D 1 tablet Oral BID AJIT Hough   enoxaparin 40 mg Subcutaneous Daily AJIT Hough   guaiFENesin 600 mg Oral BID AJIT Hough   metoprolol 5 mg Intravenous Q6H PRN Nicky Peter,    ondansetron 4 mg Intravenous Q6H PRN AJIT Hough   pantoprazole 40 mg Oral Daily AJIT Hough   psyllium 1 packet Oral Daily AJIT Hough        Today, Patient Was Seen By: Nicky Peter DO

## 2020-02-05 ENCOUNTER — APPOINTMENT (INPATIENT)
Dept: RADIOLOGY | Facility: HOSPITAL | Age: 80
DRG: 065 | End: 2020-02-05
Payer: COMMERCIAL

## 2020-02-05 ENCOUNTER — APPOINTMENT (INPATIENT)
Dept: NON INVASIVE DIAGNOSTICS | Facility: HOSPITAL | Age: 80
DRG: 065 | End: 2020-02-05
Payer: COMMERCIAL

## 2020-02-05 ENCOUNTER — ANESTHESIA (INPATIENT)
Dept: PERIOP | Facility: HOSPITAL | Age: 80
DRG: 065 | End: 2020-02-05
Payer: COMMERCIAL

## 2020-02-05 ENCOUNTER — ANESTHESIA EVENT (INPATIENT)
Dept: PERIOP | Facility: HOSPITAL | Age: 80
DRG: 065 | End: 2020-02-05
Payer: COMMERCIAL

## 2020-02-05 PROBLEM — J18.9 PNEUMONIA DUE TO INFECTIOUS ORGANISM: Status: RESOLVED | Noted: 2020-02-02 | Resolved: 2020-02-05

## 2020-02-05 PROCEDURE — 99221 1ST HOSP IP/OBS SF/LOW 40: CPT | Performed by: INTERNAL MEDICINE

## 2020-02-05 PROCEDURE — 97530 THERAPEUTIC ACTIVITIES: CPT

## 2020-02-05 PROCEDURE — 74230 X-RAY XM SWLNG FUNCJ C+: CPT

## 2020-02-05 PROCEDURE — 97112 NEUROMUSCULAR REEDUCATION: CPT

## 2020-02-05 PROCEDURE — 97163 PT EVAL HIGH COMPLEX 45 MIN: CPT

## 2020-02-05 PROCEDURE — 97760 ORTHOTIC MGMT&TRAING 1ST ENC: CPT

## 2020-02-05 PROCEDURE — 92526 ORAL FUNCTION THERAPY: CPT

## 2020-02-05 PROCEDURE — 97167 OT EVAL HIGH COMPLEX 60 MIN: CPT

## 2020-02-05 PROCEDURE — 99233 SBSQ HOSP IP/OBS HIGH 50: CPT | Performed by: INTERNAL MEDICINE

## 2020-02-05 PROCEDURE — 92611 MOTION FLUOROSCOPY/SWALLOW: CPT

## 2020-02-05 RX ORDER — METOPROLOL TARTRATE 5 MG/5ML
2.5 INJECTION INTRAVENOUS EVERY 6 HOURS
Status: DISCONTINUED | OUTPATIENT
Start: 2020-02-05 | End: 2020-02-08

## 2020-02-05 RX ORDER — PROPOFOL 10 MG/ML
INJECTION, EMULSION INTRAVENOUS CONTINUOUS PRN
Status: DISCONTINUED | OUTPATIENT
Start: 2020-02-05 | End: 2020-02-05 | Stop reason: SURG

## 2020-02-05 RX ORDER — PROPOFOL 10 MG/ML
INJECTION, EMULSION INTRAVENOUS AS NEEDED
Status: DISCONTINUED | OUTPATIENT
Start: 2020-02-05 | End: 2020-02-05 | Stop reason: SURG

## 2020-02-05 RX ADMIN — ATORVASTATIN CALCIUM 40 MG: 40 TABLET, FILM COATED ORAL at 17:31

## 2020-02-05 RX ADMIN — METOPROLOL TARTRATE 2.5 MG: 5 INJECTION, SOLUTION INTRAVENOUS at 08:43

## 2020-02-05 RX ADMIN — METOPROLOL TARTRATE 2.5 MG: 5 INJECTION, SOLUTION INTRAVENOUS at 17:18

## 2020-02-05 RX ADMIN — PROPOFOL 120 MCG/KG/MIN: 10 INJECTION, EMULSION INTRAVENOUS at 14:10

## 2020-02-05 RX ADMIN — ENOXAPARIN SODIUM 40 MG: 40 INJECTION SUBCUTANEOUS at 08:43

## 2020-02-05 RX ADMIN — SODIUM CHLORIDE 75 ML/HR: 0.9 INJECTION, SOLUTION INTRAVENOUS at 10:20

## 2020-02-05 RX ADMIN — SODIUM CHLORIDE 75 ML/HR: 0.9 INJECTION, SOLUTION INTRAVENOUS at 15:26

## 2020-02-05 RX ADMIN — METOPROLOL TARTRATE 2.5 MG: 5 INJECTION, SOLUTION INTRAVENOUS at 21:58

## 2020-02-05 RX ADMIN — PROPOFOL 80 MG: 10 INJECTION, EMULSION INTRAVENOUS at 14:09

## 2020-02-05 NOTE — ANESTHESIA POSTPROCEDURE EVALUATION
Post-Op Assessment Note    CV Status:  Stable    Pain management: adequate     Mental Status:  Alert and awake   Hydration Status:  Euvolemic   PONV Controlled:  Controlled   Airway Patency:  Patent   Post Op Vitals Reviewed: Yes      Staff: CRNA           BP     Temp     Pulse     Resp      SpO2

## 2020-02-05 NOTE — ASSESSMENT & PLAN NOTE
Acute CVA of unclear etiology  Concern for possible embolic event  Neurology request LUPE as  LUPE was unremarkable  Discussed with family and patient at length last evening as well as this morning  She is agreeable to proceed  Her son Handy Filipe is at the bedside and also witnessed and signed consent in addition to the patient  Will plan LUPE later today  Would recommend outpatient placement of loop recorder if concern for underlying arrhythmia

## 2020-02-05 NOTE — ASSESSMENT & PLAN NOTE
Continue with permissive hypertension for the next 24-48 hours and slowly start blood pressure medications

## 2020-02-05 NOTE — SPEECH THERAPY NOTE
Speech Language/Pathology      VBS completed  Recommended diet initiation of regular and thin liquids via cup  Reviewed findings with pt and multiple family members  Gentry Text sent to MD regarding diet recommendation  Report to follow       [address] Majano, MICAELA-SLP

## 2020-02-05 NOTE — PLAN OF CARE
Problem: PHYSICAL THERAPY ADULT  Goal: Performs mobility at highest level of function for planned discharge setting  See evaluation for individualized goals  Description  Treatment/Interventions: Functional transfer training, LE strengthening/ROM, Elevations, Therapeutic exercise, Endurance training, Patient/family training, Equipment eval/education, Bed mobility, Gait training, Compensatory technique education, Spoke to nursing, OT, Spoke to case management  Equipment Recommended: (TBD)       See flowsheet documentation for full assessment, interventions and recommendations  7/8/0742 [id number] by Wojciech Burns PT  Outcome: Progressing  Note:   Prognosis: Good  Problem List: Decreased strength, Decreased range of motion, Decreased endurance, Impaired balance, Decreased mobility, Decreased coordination, Impaired judgement, Decreased safety awareness   Pt requires maximal assistance to complete mobility  She was able to achieve sitting OOB and trial further transfers with [facility] and bed rail  She was able to assist minimally with transfers and gait trial  She is significantly limited by decreased strength, coordination, proprioception and balance  She requires increased time to respond and process commands  She will continue to benefit from PT services to maximize LOF  Recommend mechanical conveyance by nursing for OOB transfers and return to bed  Recommendation: (post acute therapy)     PT - OK to Discharge: Yes(when medically cleared to rehab)    See flowsheet documentation for full assessment

## 2020-02-05 NOTE — PROGRESS NOTES
Progress Note - Jackson John 1940, 70 y o  female MRN: [id number]    Unit/Bed#: -01 Encounter: [id number]    Primary Care Provider: Ofelia Cole DO   Date and time admitted to hospital: 2020  6:44 PM    * Acute ischemic cerebrovascular accident (CVA) involving left middle cerebral artery territory [facility])  Assessment & Plan  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  Repeat MRI done for worsening aphasia and showed Interval extension of the nonhemorrhagic left BUZZ territory infarction involving the parasagittal left posterior frontal lobe now extending to the cingulate gyrus  Discussed with neurology and suggested to continue with aspirin and statin for now  Telemetry showed no significant event  TTE/LUPE showed no significant clot or PFO  Patient will require loop recorder  Continue to monitor on telemetry  Continue NPO  Video fluoroscopy and will consider appropriate route for feeding  Continue PT  Essential hypertension  Assessment & Plan  Continue with permissive hypertension for the next 24-48 hours and slowly start blood pressure medications  VTE Pharmacologic Prophylaxis:   Pharmacologic: Enoxaparin (Lovenox)    Patient Centered Rounds: I have performed bedside rounds with nursing staff today    Discussions with Specialists or Other Care Team Provider:     Education and Discussions with Family / Patient:     Current Length of Stay: 3 day(s)    Current Patient Status: Inpatient   Certification Statement: The patient will continue to require additional inpatient hospital stay due to Acute CVA    Discharge Plan:  Probably in 2-3 days    Code Status: Level 1 - Full Code      Subjective:   No complaints  No events overnight      Objective:     Vitals:   Temp (24hrs), Av °F (36 7 °C), Min:97 3 °F (36 3 °C), Max:98 8 °F (37 1 °C)    Temp:  [97 3 °F (36 3 °C)-98 8 °F (37 1 °C)] 98 8 °F (37 1 °C)  HR:  [] 84  Resp:  [20-44] 23  BP: (126-216)/() 189/90  SpO2:  [92 %-96 %] 96 %  Body mass index is 23 65 kg/m²  Input and Output Summary (last 24 hours): Intake/Output Summary (Last 24 hours) at 2/5/2020 1613  Last data filed at 2/5/2020 1526  Gross per 24 hour   Intake 1445 ml   Output    Net 1445 ml       Physical Exam:     General appearance: alert, appears stated age and cooperative  Head: Normocephalic, without obvious abnormality, atraumatic  Lungs: clear to auscultation bilaterally  Heart: regular rate and rhythm  Abdomen: soft, non-tender, positive bowel sounds   Back: negative  Extremities: extremities atraumatic, no cyanosis or edema  Neurologic: Motor: Right-sided hemiplegia, expressive aphasia, right lower facial deviation    Additional Data:     Labs:    Results from last 7 days   Lab Units 02/04/20  0457   WBC Thousand/uL 9 48   HEMOGLOBIN g/dL 14 2   HEMATOCRIT % 43 7   PLATELETS Thousands/uL 238   NEUTROS PCT % 74   LYMPHS PCT % 15   MONOS PCT % 10   EOS PCT % 1     Results from last 7 days   Lab Units 02/04/20  0457   SODIUM mmol/L 139   POTASSIUM mmol/L 4 1   CHLORIDE mmol/L 104   CO2 mmol/L 29   BUN mg/dL 13   CREATININE mg/dL 0 70   ANION GAP mmol/L 6   CALCIUM mg/dL 8 6   ALBUMIN g/dL 3 2*   TOTAL BILIRUBIN mg/dL 0 69   ALK PHOS U/L 85   ALT U/L 21   AST U/L 30   GLUCOSE RANDOM mg/dL 191*         Results from last 7 days   Lab Units 02/02/20  1909   POC GLUCOSE mg/dl 136     Results from last 7 days   Lab Units 02/04/20  0457   HEMOGLOBIN A1C % 7 8*     Results from last 7 days   Lab Units 02/04/20  0959 02/03/20  0430 02/03/20  0228 02/02/20  1919   LACTIC ACID mmol/L  --   --   --  1 6   PROCALCITONIN ng/ml <0 05 0 05 <0 05  --            * I Have Reviewed All Lab Data Listed Above  * Additional Pertinent Lab Tests Reviewed:  Latrice 68 Admission Reviewed    Imaging:    Imaging Reports Reviewed Today Include: images reviewed    Recent Cultures (last 7 days):     Results from last 7 days   Lab Units 02/02/20 2122 02/02/20 1924 02/02/20  1916   BLOOD CULTURE   --  No Growth at 48 hrs  No Growth at 48 hrs  LEGIONELLA URINARY ANTIGEN  Negative  --   --        Last 24 Hours Medication List:     Current Facility-Administered Medications:  acetaminophen 650 mg Oral Q6H PRN Amos Farfan MD    albuterol 2 5 mg Nebulization Q6H PRN Amos Farfan MD    aspirin 81 mg Oral Daily Amos Farfan MD    atorvastatin 40 mg Oral QPM Amos Farfan MD    enoxaparin 40 mg Subcutaneous Daily Amos Farfan MD    metoprolol 2 5 mg Intravenous Q6H Amos Farfan MD    ondansetron 4 mg Intravenous Q6H PRN Amos Farfan MD    pantoprazole 40 mg Oral Daily Amos Farfan MD    sodium chloride 75 mL/hr Intravenous Continuous Amos Farfan MD Last Rate: 75 mL/hr (02/05/20 1526)        Today, Patient Was Seen By: Amos Farfan MD    ** Please Note: Dictation voice to text software may have been used in the creation of this document   **

## 2020-02-05 NOTE — PLAN OF CARE
Problem: PHYSICAL THERAPY ADULT  Goal: Performs mobility at highest level of function for planned discharge setting  See evaluation for individualized goals  Description  Treatment/Interventions: Functional transfer training, LE strengthening/ROM, Elevations, Therapeutic exercise, Endurance training, Patient/family training, Equipment eval/education, Bed mobility, Gait training, Compensatory technique education, Spoke to nursing, OT, Spoke to case management  Equipment Recommended: (TBD)       See flowsheet documentation for full assessment, interventions and recommendations  1/2/1451 2381 by Arlin Calabrese PT  Note:   Prognosis: Good  Problem List: Decreased strength, Decreased range of motion, Decreased endurance, Impaired balance, Decreased mobility, Decreased coordination, Impaired judgement, Decreased safety awareness  Assessment: Pt is a 70 y o  female seen for PT evaluation s/p admission to [address] [place] [address] on 2/2/2020 with Acute ischemic cerebrovascular accident (CVA) involving left middle cerebral artery territory [facility])  Order placed for PT services  Unable to see patient prior to this date due to medical status  Pt with Interval extension of the nonhemorrhagic left BUZZ territory infarction involving the parasagittal left posterior frontal lobe now extending to the cingulate gyrus, Stable PCA territory parasagittal parietal infarctions, 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  Upon evaluation: Pt is presenting with impaired functional mobility due to decreased strength, decreased ROM, decreased endurance, impaired balance, impaired coordination, impaired proprioception, gait deviations, impaired tone, decreased safety awareness, impaired judgment and fall risk requiring maximal assistance for bed mobility, maximal assistance of 2 for transfers and inability to ambulate   Pt's clinical presentation is currently unstable/unpredictable given the functional mobility deficits above, especially weakness, decreased ROM, decreased endurance, impaired coordination, gait deviations, decreased activity tolerance, decreased functional mobility tolerance, decreased safety awareness, impaired judgement and impaired tone, coupled with fall risks as indicated by AM-PAC 6-Clicks: 8/53 as well as hx of falls, impaired balance, polypharmacy, impaired coordination, impaired judgement and decreased safety awareness and combined with medical complications of abnormal H&H, abnormal WBCs, abnormal blood sugars, fear/retreat and need for input for mobility technique/safety  Pt's PMHx and comorbidities that may affect physical performance and progress include: HTN  Personal factors affecting pt at time of IE include: limited home support, inability to perform ADLs, inability to navigate level surfaces without external assistance, inability to ambulate household distances and recent fall(s)/fall history  Pt will benefit from continued skilled PT services to address deficits as defined above and to maximize level of functional mobility to facilitate return toward PLOF and improved QOL  From PT/mobility standpoint, recommendation at time of d/c would be Short term rehab pending progress in order to reduce fall risk and maximize pt's functional independence and consistency with mobility in order to facilitate return to PLOF  Recommend ther ex next 1-2 sessions  Recommendation: (post acute therapy)     PT - OK to Discharge: Yes(when medically cleared to rehab)    See flowsheet documentation for full assessment

## 2020-02-05 NOTE — ANESTHESIA PREPROCEDURE EVALUATION
Review of Systems/Medical History  Patient summary reviewed  Chart reviewed  No history of anesthetic complications     Cardiovascular  EKG reviewed, Exercise tolerance (METS): >4,  Hypertension poorly controlled,    Pulmonary  Smoker ex-smoker  , Pneumonia (Ruled out, initially suspected on admission but negative procal x2, abx discontinued  Maintaining saturations on room air and remains afebrile),        GI/Hepatic    GERD ,   Comment: Confirmed NPO appropriate     Negative  ROS        Endo/Other  Diabetes (Hgb A1c 7 8 this admission) ,      GYN  Negative gynecology ROS          Hematology  Negative hematology ROS      Musculoskeletal    Comment: Multiple falls at home      Neurology    CVA (Left MCA territory-- right sided weakness and expressive aphasia) , acute,    Psychology           Physical Exam    Airway    Mallampati score: II  TM Distance: >3 FB  Neck ROM: full     Dental   No notable dental hx     Cardiovascular  Rhythm: regular, Rate: normal, Cardiovascular exam normal    Pulmonary  Pulmonary exam normal     Other Findings        Anesthesia Plan  ASA Score- 3     Anesthesia Type- IV sedation with anesthesia with ASA Monitors  Additional Monitors:   Airway Plan:     Comment: I discussed the risks and benefits of IV sedation anesthesia including the possibility of the need to convert to general anesthesia and the potential risk of awareness  The patient was given the opportunity to ask questions, which were answered        Plan Factors-    Induction- intravenous  Postoperative Plan-     Informed Consent- Anesthetic plan and risks discussed with patient and son  I personally reviewed this patient with the CRNA  Discussed and agreed on the Anesthesia Plan with the CRNA           TTE 2/3/2020:  SUMMARY  LEFT VENTRICLE:  Size was normal   Systolic function was normal  Ejection fraction was estimated in the range of 60 % to 65 %  There were no regional wall motion abnormalities    Wall thickness was at the upper limits of normal   Doppler parameters were consistent with abnormal left ventricular relaxation (grade 1 diastolic dysfunction)  RIGHT VENTRICLE:  The size was normal   Systolic function was normal   LEFT ATRIUM:  The atrium was mildly dilated  MITRAL VALVE:  Valve structure was normal   There was no significant regurgitation  TRICUSPID VALVE:  The valve structure was normal   There was trace regurgitation        Lab Results   Component Value Date    WBC 9 48 02/04/2020    HGB 14 2 02/04/2020    HCT 43 7 02/04/2020    MCV 80 (L) 02/04/2020     02/04/2020     Lab Results   Component Value Date    SODIUM 139 02/04/2020    K 4 1 02/04/2020     02/04/2020    CO2 29 02/04/2020    BUN 13 02/04/2020    CREATININE 0 70 02/04/2020    GLUC 191 (H) 02/04/2020    CALCIUM 8 6 02/04/2020     Lab Results   Component Value Date    ALT 21 02/04/2020    AST 30 02/04/2020    ALKPHOS 85 02/04/2020     No results found for: INR, PROTIME  Lab Results   Component Value Date    HGBA1C 7 8 (H) 02/04/2020

## 2020-02-05 NOTE — OCCUPATIONAL THERAPY NOTE
[address] Evaluation     Patient Name: Danuta Tidwell  Today's Date: 2/5/2020  Problem List  Principal Problem:    Acute ischemic cerebrovascular accident (CVA) involving left middle cerebral artery territory [facility])  Active Problems:    Essential hypertension    Pneumonia due to infectious organism    Fall    Gastroesophageal reflux disease without esophagitis    Generalized weakness    Respiratory distress    Acute CVA (cerebrovascular accident) [facility])    Past Medical History  Past Medical History:   Diagnosis Date    CVA (cerebral vascular accident) ([address] ) 02/2020    GERD (gastroesophageal reflux disease)     Hypertension      Past Surgical History  Past Surgical History:   Procedure Laterality Date    CHOLECYSTECTOMY             02/05/20 0939   Note Type   Note type Eval/Treat   Restrictions/Precautions   Other Precautions Limb alert; Chair Alarm; Bed Alarm; Fall Risk;Multiple lines;Telemetry   Pain Assessment   Pain Assessment 0-10  (Simultaneous filing  User may not have seen previous data )   Pain Score No Pain   Home Living   Type of [address] [place] [address] One level  (0 TIARA)   Bathroom Shower/Tub Tub/shower unit   Additional Comments Pt reports living in a 1 story home with her  with 0 TIARA  Pt ambulating with no AD PTA  Prior Function   Level of [place] Independent with ADLs and functional mobility   Lives With Spouse   Receives Help From Family  (share responsibilities)   ADL Assistance Independent   IADLs Independent  (shared responsibilities (-) driving)   Falls in the last 6 months 1 to 4   Comments Pt reports completing ADLs and IADL tasks + functional mobility @ I with no AD  Pt has assistance for driving and community mobility from      Lifestyle   Autonomy Pt reports being I PTA for ADLs and homekeeping IADL tasks   Reciprocal Relationships Pt lives at home with her    Intrinsic Gratification Pt enjoys spending time with her familyl   ADL   Grooming Assistance 4 Minimal Assistance  (supine in bed with use of LUE)   Additional Comments Supine in bed Pt can participate in ADL tasks with use of LUE  UB ADLs @ Mod A, LB ADLs @ Max A   Bed Mobility   Supine to Sit 2  Maximal assistance   Additional items Assist x 1; Increased time required;Verbal cues;LE management   Transfers   Sit to Stand 2  Maximal assistance   Additional items Assist x 2; Increased time required   Stand to Sit 2  Maximal assistance   Additional items Assist x 2   Stand pivot 2  Maximal assistance   Additional items Assist x 2   Sit pivot 2  Maximal assistance   Additional items Assist x 1  (+ Min x's 1)   Additional Comments Max x's 2 with R knee blocking, RUE support, Max A for weight shifting and vc'ing  Functional Mobility   Functional Mobility 2  Maximal assistance  (x's 2)   Balance   Static Sitting Poor   Dynamic Sitting Poor -   Static Standing Poor -   Activity Tolerance   Activity Tolerance Patient limited by fatigue;Patient tolerated treatment well   Medical Staff Made Aware Spoke with PTChris with RNJoanne Overall PROM   R Shoulder Flexion PROM [facility]   R Shoulder ABduction PROM WFL   R Elbow Flexion PROM WFL   R Elbow Extension PROM WFL   R Wrist Flexion PROM WFL   R Wrist Extension PROM WFL   R Finger Flexion PROM WFL   R Finger Extension PROM WFL   RUE Tone   RUE Tone Hypotonic   LUE Assessment   LUE Assessment WFL   Hand Function   Gross Motor Coordination Impaired   Fine Motor Coordination Impaired   Sensation   Light Touch Partial deficits in the RUE   Proprioception   Proprioception Severe deficits in the RUE   Cognition   Overall Cognitive Status WFL   Arousal/Participation Responsive; Alert; Cooperative   Attention Attends with cues to redirect   Orientation Level Oriented X4   Following Commands Follows one step commands with increased time or repetition   Assessment   Limitation Decreased ADL status; Decreased UE ROM; Decreased UE strength;Decreased Safe judgement during ADL;Decreased endurance;Decreased sensation;Decreased fine motor control;Decreased self-care trans;Decreased high-level ADLs; Non-func R UE   Prognosis Fair;Good   Assessment Pt is a 69 y/o F admitted to [address] [place] [address] 2/3/2020 d/t experiencing a cough, dizziness, and generalizes weakness resulting in multiple falls  Dx: Pneumonia  During Pt's hospital stay, Pt experiencing worsening R sided weakness which MRI showed acute ischemic CVA involving L MCA  Pt with PMHx impacting performance during functional tasks including: HTN, pneumonia, GERD  Pt reports living at home with her  in a 1 story home with 0 TIARA  Pt reports being I with all ADLs, housekeeping IADL tasks and functional mobility  Pt does not drive and has assistance for all community mobility from her   On evaluation, Pt A&Ox4  Therapist assessing BUE MS and ROM  Pt LUE WFL for ROM and MS  RUE significantly impaired  WFL PROM, Pt with slight muscle contraction noted in bicep and tricep during elbow flexion/extension with gravity eliminated  Pt with no finger flexion/extension noted although able to achieve [facility] PROM  Pt with decrease sensation in RUE, able to identify light touch ~50% of the time  Pt completing supine>sit @ Max A x's 1  Pt sitting EOB while maintaining P balance  Mod A required to maintain balance despite vc'ing to self correct  Pt with significant posterior and R lateral lean  Pt completing STS from EOB>kari-walker @ Max A xs 2 with R knee blocking, Max A for weight shifting to center and RUE support  (please refer to treatment note for remainder of therapy session)  Pt scoring 5/100 on Barthel index   Pt presents with decrease activity tolerance, decrease standing balance, decrease sitting balance, decrease performance during ADL tasks, decrease safety awareness , decrease BUE ROM, decrease UB MS, decrease generalized strength, decrease activity engagement, decrease performance during functional transfers, decrease sensation, decrease FM control and decrease GM control  Pt would benefit from continued acute OT services to address deficits as well as post acute rehab services upon [facility] [address] [facility]  Pt has good rehab potential d/t intact cognition and good participation this date  Plan   Treatment Interventions ADL retraining;Functional transfer training;UE strengthening/ROM; Endurance training;Patient/family training;Equipment evaluation/education; Neuromuscular reeducation; Fine motor coordination activities; Compensatory technique education;Continued evaluation;UE splinting; Energy conservation; Activityengagement   Goal Expiration Date 02/19/20   OT Treatment Day 1   OT Frequency 3-5x/wk   Additional Treatment Session   Start Time 5671   End Time 1801   Treatment Assessment Pt seen for treatment session #1 this date  Pt alert and agreeable to participate at this time  Pt sitting EOB while maintaining P balance, requiring Mod-max A to maintain sitting upright  Severe posterior and R lateral lean noted  Unable to self-correct with vc'ing  Pt completing STS from EOB>kari-walker @ Max x's 2 with assistance for weight shifting, R knee blocking, and RUE support  Attempting to complete SPT with kari-walker and Max x's 2 although unable at this time d/t pt requiring Max A for standing and to progress RLE  Pt returned to sitting EOB  Pt then completing squat pivot from EOB>recliner chair with Max A x's 1 and Min x's 1  Pt then completing STS from recliner chair @ Max x's 1 with LUE support on fixed arm rail  Pt able to use arm rails for support and taking 1 step with each foot @ Max x's 2  Once seated in chair, therapist educated Pt on importance of positioning RUE properly to decrease risk for subluxation, contractures, and skin breakdown  Therapist provided Pt with wash cloth roll up which was applied in Pt's R palm to decrease risk for contractures  Pt tolerating well, nursing staff notified and educated   Pt would benefit from a resting hand splint moving forward  Pt returned to recliner and positioned properly with BLE elevated, B pillows under each arm, call bell in reach, chair alarm intact, tray table in front of Pt and all needs met at this time  Pt continues to demonstrate decrease activity tolerance, decrease standing balance, decrease sitting balance, decrease performance during ADL tasks, decrease safety awareness , decrease BUE ROM, decrease UB MS, decrease generalized strength, decrease activity engagement, decrease performance during functional transfers, decrease sensation, decrease FM control and decrease GM control  Pt would benefit from continued acute OT services to address deficits as well as post acute rehab services upon d/c from [facility]  Additional Treatment Day 1   Recommendation   Recommendation   (post acute rehab)   OT Discharge Recommendation   (post acute rehab)   Barthel Index   Feeding 0   Bathing 0   Grooming Score 0   Dressing Score 0   Bladder Score 0   Bowels Score 5   Toilet Use Score 0   Transfers (Bed/Chair) Score 0   Mobility (Level Surface) Score 0   Stairs Score 0   Barthel Index Score 5       Pt goals to be met by 2/19/2020    1  Pt will demonstrate ability to complete grooming/hygiene tasks @ S after set-up after set-up  2  Pt will demonstrate ability to complete supine<>sit @ Min A in order to increase safety and independence during ADL tasks  3  Pt will demonstrate ability to complete UB ADLs including washing/dressing @ Min A with use of compensatory strategies PRN in order to increase performance and participation during meaningful tasks  4  Pt will demonstrate ability to sit unsupported at EOB for 10+ minutes while maintaining F balance in order to increase independence and participation during meaningful tasks  5  Pt will demonstrate ability to complete EOB, chair, toilet/commode transfers @ Max A x's 1 in order to increase performance and participation during functional tasks    6  Pt will demonstrate ability to stand for 5 minutes while maintaining F balance with use of kari-walker or RW for UB support PRN in order to increase performance and reduce dependency on caregivers during LB ADLs  7  Pt will demonstrate Good carryover of RUE self ROM exercises to complete with assistance from LUE in order to maintain current ROM and prevent contractures  8  Pt will increase MS in RUE to 2/5 demonstrated by ability to complete elbow flexion/extension with gravity eliminated in order to increase performance and participation during meaningful tasks  9  Pt will demonstrate ability to tolerate 30-35 minute OT session with no vc'ing for deep breathing or use of energy conservation techniques in order to increase activity tolerance during functional tasks  10  Pt will demonstrate Good carryover of use of energy conservation/compensatory strategies during ADLs and functional tasks in order to increase safety and reduce risk for falls  11  Pt will demonstrate Good attention and participation in continued evaluation of functional ambulation house hold distances in order to assist with safe d/c planning  12  Pt will attend to continued cognitive assessments 100% of the time in order to provide most appropriate d/c recommendations  13  Pt will identify 3 areas of interest/hobbies and 1 intervention on how to incorporate into daily life in order to increase interaction with environment and peers as well as increase participation in meaningful tasks  14  Pt will demonstrate 100% carryover of BUE HEP in order to increase BUE MS and increase performance during functional tasks upon d/c home      NEEL Bustamante/FELIX

## 2020-02-05 NOTE — OP NOTE
OPERATIVE REPORT  PATIENT NAME: Chioma Lagos    :  1940  MRN: [id number]  Pt Location: OW OR ROOM 02    SURGERY DATE: 2020    Surgeon(s) and Role:     * Rosa Maria Curiel DO - Primary    Preop Diagnosis:  CVA    * No Diagnosis Codes entered *    Procedure(s) (LRB):  TRANSESOPHAGEAL ECHOCARDIOGRAM (LUPE) (N/A)    Specimen(s):  * No specimens in log *    Estimated Blood Loss:   None    Drains:  External Urinary Catheter (Active)   Collection Container Canister and suction tubing (For Female) 2/3/2020  4:00 PM   Suction Pressure (mmHg) 60 mmHg 2/3/2020  4:00 PM   Interventions Removed and skin assessed; Device changed 2/3/2020  4:00 PM   Output (mL) 200 mL 2/3/2020  4:00 PM   Number of days: 2       Anesthesia Type:   Propofol per anesthesia    Operative Indications:  CVA    Operative Findings:  No left atrial appendage thrombus  No PFO/ASD with injection of agitated saline  Dilated left atrium  Layered non mobile plaque noted in the aorta  No cardiac source of emboli noted  Full LUPE report to follow      Complications:   None    Procedure and Technique:  LUPE with color flow Doppler     I was present for the entire procedure    Patient Disposition:  APU    SIGNATURE: Pratik Nicholson DO  DATE: 2020  TIME: 2:32 PM

## 2020-02-05 NOTE — SPEECH THERAPY NOTE
Speech Language/Pathology    Recommended diet: NPO until completion of VBS  Frequent oral care  Pt easily woken  Awake and alert  Oral care completed prior to po trials  Volitional swallow x2  Puree trials x2 yielded functional formulation, transfer, and swallow initiation  Mech soft x1 yielded slightly prolonged mastication but functional with adequate formulation, transfer, and swallow initiation  HTLs x3 H20 and NTLsx3 via spoon and cup yielded bolus hold prior to transfer and suspected delayed swallow initiation per palpation  2 instances of weak cough with x1 HT and x1NT  Multiple swallows per bolus of up to 3 instances  Slight increased SOB during po trials  Recommended VBS to establish LRD and r/o aspiration  NPO until study completed  PA ordered VBS  Awaiting until LUPE completed       Sergio Padron [address] [place] [address], [facility]-SLP

## 2020-02-05 NOTE — PHYSICAL THERAPY NOTE
PHYSICAL THERAPY EVALUATION  NAME:  Hiawatha Romberg  DATE: 02/05/20    AGE:   70 y o  Mrn:   [id number]  ADMIT DX:  Numbness [R20 0]  Generalized weakness [R53 1]  Ambulatory dysfunction [R26 2]  Pneumonia of both lower lobes due [facility] infectious organism ([facility] ) [J18 1]    Past Medical History:   Diagnosis Date    [facility]A [facility]cerebral vascular accident) ([facility] ) 02/2020    GERD (gastroesophageal reflux disease)     Hypertension      Length Of Stay: 3  Performed at least 2 patient identifiers during session: Name, Barbara Moore and ID bracelet  PHYSICAL THERAPY EVALUATION :      02/05/20 8559   Note Type   Note type Eval/Treat   Pain Assessment   Pain Assessment 0-10   Pain Score No Pain   Home Living   Type of [address] [place] [address] One level  (no TIARA)   Bathroom Shower/Tub Tub/shower unit   Additional Comments Pt reports living in a 1 story home with her  with 0 TIARA  Pt ambulating with no AD PTA  Prior Function   Level of [place] Independent with ADLs and functional mobility   Lives With Spouse   Receives Help From Family  (share responsibilities)   ADL Assistance Independent   IADLs Independent  (shared responsibilities (-) driving)   Falls in the last 6 months 1 to 4   Restrictions/Precautions   Other Precautions Limb alert; Fall Risk;Telemetry; Bed Alarm; Chair Alarm   Cognition   Orientation Level Oriented X4   Following Commands Follows one step commands with increased time or repetition   Comments increased time to respond/process     RLE Assessment   RLE Assessment X   RLE Overall PROM   R Hip Flexion WNL  (minimal AROM-involuntary < 10 degrees)   R Hip Extension WNL   R Hip ABduction WNL   R Hip ADduction WNL  (involuntary)   R Knee Flexion WNL  (minimal AROM-involuntary < 10 degrees)   R Knee Extension WNL   R Ankle Dorsiflexion WNL  (minimal AROM-involuntary at toes only)   R Ankle Plantar Flexion WNL   Strength RLE   RLE Overall Strength 2-/5   Tone RLE   RLE Tone Hypertonic   LLE Assessment   LLE Assessment WFL   Coordination   Movements are Fluid and Coordinated 0   Coordination and Movement Description R LE   Light Touch   RLE Light Touch Grossly intact   LLE Light Touch Grossly intact   Sharp/Dull   RLE Sharp/Dull Impaired   Bed Mobility   Supine to Sit 2  Maximal assistance   Additional items Assist x 1; Increased time required;LE management;Verbal cues  (trunk management, VCs for technique)   Additional Comments HOB elevated 15 degrees   Transfers   Sit to Stand 2  Maximal assistance   Additional items Assist x 2   Stand to Sit 2  Maximal assistance   Additional items Assist x 2   Stand pivot   (unable to complete spt despite maxAx2)   Additional Comments sit<>stand with HW with L hand on walker for ant wt shift  achieved standing with B knees and feet blocked with maxAx2 with max cues for uprihgt posture  static standing with maxAx2  returned to sitting for safety  unable to advance LEs to complete spt despite maxAx2 and manual cues for wt shifting  sit<>stand without AD with B knees and feet blocked with maxAx2  manual and verbal cues for upright posture  wide SY  unable to advance LEs despite manual and verbal cues for wt shifting and LE advancement  returned to sitting  Ambulation/Elevation   Gait Assistance   (refer to note)   Balance   Static Sitting Poor  (modAx1 w inc lat/post lean R  VCs/visual cues for posture)   Dynamic Sitting Poor -   Static Standing Poor -   Activity Tolerance   Activity Tolerance Patient limited by fatigue   Medical Staff Made Aware Arnie VIDALES   Nurse Made Aware Joanne SMITH   Assessment   Prognosis Good   Problem List Decreased strength;Decreased range of motion;Decreased endurance; Impaired balance;Decreased mobility; Decreased coordination; Impaired judgement;Decreased safety awareness   Goals   STG Expiration Date 02/15/20   PT Treatment Day 1   Plan   Treatment/Interventions Functional transfer training;LE strengthening/ROM; Elevations; Therapeutic exercise; Endurance training;Patient/family training;Equipment eval/education; Bed mobility;Gait training; Compensatory technique education;Spoke to nursing;OT;Spoke to case management   PT Frequency Other (Comment)  (5x/week and 1x either sat or sun)   Recommendation   Recommendation   (post acute therapy)   Equipment Recommended   (TBD)   PT - OK to Discharge Yes  (when medically cleared to rehab)   Additional Comments [facility] 6 clicks 4/49     (Please find full objective findings from PT assessment regarding body systems outlined above)  Assessment: Pt is a 70 y o  female seen for PT evaluation s/p admission to [address] [place] [address] on 2/2/2020 with Acute ischemic cerebrovascular accident (CVA) involving left middle cerebral artery territory [facility])  Order placed for PT services  Unable to see patient prior to this date due to medical status  Pt with Interval extension of the nonhemorrhagic left BUZZ territory infarction involving the parasagittal left posterior frontal lobe now extending to the cingulate gyrus, Stable PCA territory parasagittal parietal infarctions, 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  Upon evaluation: Pt is presenting with impaired functional mobility due to decreased strength, decreased ROM, decreased endurance, impaired balance, impaired coordination, impaired proprioception, gait deviations, impaired tone, decreased safety awareness, impaired judgment and fall risk requiring maximal assistance for bed mobility, maximal assistance of 2 for transfers and inability to ambulate   Pt's clinical presentation is currently unstable/unpredictable given the functional mobility deficits above, especially weakness, decreased ROM, decreased endurance, impaired coordination, gait deviations, decreased activity tolerance, decreased functional mobility tolerance, decreased safety awareness, impaired judgement and impaired tone, coupled with fall risks as indicated by AM-PAC 6-Clicks: 5/48 as well as hx of falls, impaired balance, polypharmacy, impaired coordination, impaired judgement and decreased safety awareness and combined with medical complications of abnormal H&H, abnormal WBCs, abnormal blood sugars, fear/retreat and need for input for mobility technique/safety  Pt's PMHx and comorbidities that may affect physical performance and progress include: HTN  Personal factors affecting pt at time of IE include: limited home support, inability to perform ADLs, inability to navigate level surfaces without external assistance, inability to ambulate household distances and recent fall(s)/fall history  Pt will benefit from continued skilled PT services to address deficits as defined above and to maximize level of functional mobility to facilitate return toward PLOF and improved QOL  From PT/mobility standpoint, recommendation at time of d/c would be Short term rehab pending progress in order to reduce fall risk and maximize pt's functional independence and consistency with mobility in order to facilitate return to PLOF  Recommend ther ex next 1-2 sessions  Goals: Pt will: Perform bed mobility tasks with minAx1 to reposition in bed and prepare for transfers  Pt will statically/dynamically sit EOB with steadying assistance >/= 10' to prepare for transfers  Pt will perform transfers sit<>stand with LRAD with modAx1 to decrease burden of care and improve ease of transfers and prepare for ambulation  Pt will spt with LRAD with modAx2 to preapre for ambulation  Pt will ambulate with LRAD for >/= 10' with  modAx2  to decrease burden of care, decrease risk for falls, improve activity tolerance and improve gait quality and to access home environment  Ayush Hoff, PT,DPT         PHYSICAL THERAPY TREATMENT NOTE  Time [id number]  Time Out: 0938  Total Time: 21'      S: "I feel better"  O:  Static sitting balance at EOB 8' with minimal verbal cues for upright posture and decreased posterior lean   Moderate manual cues to maintain with therapist adjustment of R UE and LEs to facilitate balance and approximation of R UE and LE for feedback to facilitate mobility  Squat pivot transfer with maxAx1 and minAx1 to patient's left with verbal cues for task and hand placement  Sit to stand from recliner with maxAx2 with L UE support on HW  With maxAx2, patient pivoting L LE to advance positioning to left and manual assistance to advance R LE  Patient positioned at foot of bed with bedrail on left  Sit<>stand with maxAx1 with pt using bedrail to pull to stand  R UE supported by therapist  Don SY upon standing  Ambulated 1 step forward with maxAx2 with L UE support on bedrail with max manual assistance to advance R LE and max manual cues for wt shifting to patient's right to advance L LE  Unable to ambulate further due to fatigue and safety  Pt requires PT /OT co-treat due to signficant assistance with mobility and impairments  A:  Pt requires maximal assistance to complete mobility  She was able to achieve sitting OOB and trial further transfers with [facility] and bed rail  She was able to assist minimally with transfers and gait trial  She is significantly limited by decreased strength, coordination, proprioception and balance  She requires increased time to respond and process commands  She will continue to benefit from PT services to maximize LOF  Recommend mechanical conveyance by nursing for OOB transfers and return to bed     P:  Recommend addition of therapeutic exercises to current functional mobility program      Kylee Moore, PT, DPT

## 2020-02-05 NOTE — CASE MANAGEMENT
Discussed in rounds today  Stroke: Plan is video swallowing today, may need a NGT  Plan is LUPE  On IV fluids  PT/OT to see  Placed referral to [address] [place] [address], as this is families choice  Authorization is needed as patient has [employer]  Once therapy notes are in will upload information to [address] [place] [address]  Will continue to follow

## 2020-02-05 NOTE — PLAN OF CARE
Problem: OCCUPATIONAL THERAPY ADULT  Goal: Performs self-care activities at highest level of function for planned discharge setting  See evaluation for individualized goals  Description  Treatment Interventions: ADL retraining, Functional transfer training, UE strengthening/ROM, Endurance training, Patient/family training, Equipment evaluation/education, Neuromuscular reeducation, Fine motor coordination activities, Compensatory technique education, Continued evaluation, UE splinting, Energy conservation, Activityengagement          See flowsheet documentation for full assessment, interventions and recommendations  Note:   Limitation: Decreased ADL status, Decreased UE ROM, Decreased UE strength, Decreased Safe judgement during ADL, Decreased endurance, Decreased sensation, Decreased fine motor control, Decreased self-care trans, Decreased high-level ADLs, Non-func R UE  Prognosis: Fair, Good  Assessment: Pt is a 71 y/o F admitted to [address] [place] [address] 2/3/2020 d/t experiencing a cough, dizziness, and generalizes weakness resulting in multiple falls  Dx: Pneumonia  During Pt's hospital stay, Pt experiencing worsening R sided weakness which MRI showed acute ischemic CVA involving L MCA  Pt with PMHx impacting performance during functional tasks including: HTN, pneumonia, GERD  Pt reports living at home with her  in a 1 story home with 0 TIARA  Pt reports being I with all ADLs, housekeeping IADL tasks and functional mobility  Pt does not drive and has assistance for all community mobility from her   On evaluation, Pt A&Ox4  Therapist assessing BUE MS and ROM  Pt LUE WFL for ROM and MS  RUE significantly impaired  WFL PROM, Pt with slight muscle contraction noted in bicep and tricep during elbow flexion/extension with gravity eliminated  Pt with no finger flexion/extension noted although able to achieve [facility] PROM  Pt with decrease sensation in RUE, able to identify light touch ~50% of the time   Pt completing supine>sit @ Max A x's 1  Pt sitting EOB while maintaining P balance  Mod A required to maintain balance despite vc'ing to self correct  Pt with significant posterior and R lateral lean  Pt completing STS from EOB>kari-walker @ Max A xs 2 with R knee blocking, Max A for weight shifting to center and RUE support  (please refer to treatment note for remainder of therapy session)  Pt scoring 5/100 on Barthel index  Pt presents with decrease activity tolerance, decrease standing balance, decrease sitting balance, decrease performance during ADL tasks, decrease safety awareness , decrease BUE ROM, decrease UB MS, decrease generalized strength, decrease activity engagement, decrease performance during functional transfers, decrease sensation, decrease FM control and decrease GM control  Pt would benefit from continued acute OT services to address deficits as well as post acute rehab services upon d/c from [address] [place] [address]  Pt has good rehab potential d/t intact cognition and good participation this date    Recommendation: (post acute rehab)  OT Discharge Recommendation: (post acute rehab)

## 2020-02-05 NOTE — SPEECH THERAPY NOTE
Speech-Language Pathology Bedside Swallow Evaluation      Patient Name: Frederick Coleman    Today's Date: 2/4/2020     Problem List  Principal Problem:    Acute ischemic cerebrovascular accident (CVA) involving left middle cerebral artery territory [facility])  Active Problems:    Essential hypertension    Pneumonia due to infectious organism    Fall    Gastroesophageal reflux disease without esophagitis    Generalized weakness    Respiratory distress    Acute CVA (cerebrovascular accident) [facility])      Past Medical History  Past Medical History:   Diagnosis Date    GERD (gastroesophageal reflux disease)     Hypertension        Past Surgical History  Past Surgical History:   Procedure Laterality Date    CHOLECYSTECTOMY         Summary   Pt presented with s/s suggestive of suspected mild oral and moderate-severe pharyngeal dysphagia  Symptoms or concerns include: reduced oral agility, extended time for oral transit, delayed swallow initiation, and reduced airway protection via multiple coughs post swallow  Evaluation consisted of HT H20 only and based upon recent dx of evolving CVA, fatigue, change in condition from 2/3 evaluation, and poor tolerance of po trials, additional trials not appropriate at this time  Recommend NPO as pt is not safe for po intake  Will asses in AM with goal to establish LRD  Possible candidate for VBS  Initiate dysphagia tx and complete speech/language evaluation  Risk/s for Aspiration: recent CVA worsening, fatigue, poor following of commands    Recommended Diet: NPO except medications   Recommended Form of Meds: non-oral if possible   Aspiration precautions and swallowing strategies: >30 degree in bed, frequent oral care  Other Recommendations/Considerations: Possible candidate for VBS, pending results from repeat bedside dysphagia evaluation next date  If continues to remain unappropriate for oral intake, consider alternative means of nutrition       Completed bedside dysphagia evaluation on 2/3/2020 and pt passed with recommendation of current diet: regular and thin liquids  Current Medical Status  Buck Clark is a 70 y o  female with history of essential hypertension and GERD who presents with a one day history of cough, dizziness, and weakness resulting in several falls  She did not hit her head, but she says her legs are very weak, and gave out twice  She was able to get up by herself the first two times, but the 3rd time her  had to help her get up off the floor and pulled her right shoulder, she is experiencing pain  She says her cough is productive of sputum, she did not know the color  The dizziness is more of a lightheadedness, which is worsened when she stands up  The weakness is bilateral and non-focal   She denies fever or chills, chest pain or shortness of breath, nausea or vomiting, diarrhea, hematemesis  She does say that she had a decreased appetite and did not take her medication today  Current Precautions:  Fall  Aspiration      Past medical history:Please see H&P for details    Special Studies:  Most recent MRI on 2/4/2020: Interval extension of the nonhemorrhagic left MCA territory infarction involving the parasagittal left posterior frontal lobe now extending to the cingulate gyrus  Stable PCA territory parasagittal parietal infarctions  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  Social/Education/Vocational Hx:  Pt lives with       Swallow Information   Current Risks for Dysphagia & Aspiration: CVA, AMS and decreased alertness  Current Symptoms/Concerns: coughing with po, wet vocal quality and change in respiratory status  Current Diet: NPO   Baseline Diet: regular diet and thin liquids      Baseline Assessment   Behavior/Cognition: lethargic  Speech/Language Status: able to follow commands inconsistently, limited verbal output and additional time for processing with at times absent response  Patient Positioning: upright in bed  Pain Status/Interventions/Response to Interventions:  No report of or nonverbal indications of pain  Speech/language evaluation warranted  Screen completed on 2/3/2020 and passed  Swallow Mechanism Exam  Facial: slight right facial weakness  Labial: decreased strength and decreased coordination  Lingual: bilateral decreased strength and decreased coordination  Velum: symmetrical  Mandible:  decreased ROM  Dentition: adequate and natural  Oral care completed  Vocal quality:weak   Volitional Cough: unable to initiate volitional cough   Unable to complete volitional swallow with digital stimulation      Consistencies Assessed and Performance   Consistencies Administered: honey thick  Materials administered included HTL via spoon anterior 1/4 and downward pressure on lingum    Oral Stage: unable to fully assess due to limited trials  Functional labial seal around spoon with extraction from anterior 1/4  Additional time for formulation and transfer  Pharyngeal Stage: suspected moderate-severe  Swallow Mechanics:  Swallowing initiation appeared delayed and required 2-3 swallows per controlled bolus  Laryngeal rise was palpated and judged to be limited with suspected reduced hyolaryngeal elevation and anterior thrust   2/3 trials via spoon of HTL yielded extensive 4-6 cough response and intermittent wet quality post delayed swallow  Difficulty with completion of volitional swallow requiring digital stimulation with + success  @ rest, breathing quality wet  Esophageal Concerns: none reported    Strategies and Efficacy: oral care, 90 degree elevation, feed assist due to reduced ROM of dominant hand (rt)    Summary and Recommendations (see above)    Results Reviewed with: patient, RN, family and PT     Caregiver Education: initiated  I educated pt and family in basics re: current swallow function and associated aspiration risks, airway protection, NPO recommendation, and initiation of ST services  Caregivers would benefit from continued education and support  Treatment Recommended: dysphagia tx  Speech/language evaluation     Frequency of treatment: 1-3x week    Dysphagia LTG per SLP  -Patient will demonstrate optimum safety and efficacy of oral intake and swallowing function without overt s/sx of penetration or aspiration for the highest appropriate diet level  Short Term Goals:  -Pt will tolerate Dysphagia 1/pureed diet and honey thick liquid with no significant s/s oral or pharyngeal dysphagia across 1-3 diagnostic session/s     -Patient will tolerate trials of upgraded food and/or liquid texture with no significant s/s of oral or pharyngeal dysphagia including aspiration across 1-3 diagnostic sessions     -Patient will comply with a Video/Modified Barium Swallow /instrumental swallow study for more complete assessment of anatomy and physiology of the swallowing skills, to assess efficacy of treatment techniques so as to best guide treatment plan      Speech Therapy Prognosis   Prognosis: good    Prognosis Considerations: age and medical status       [address], [facility]-SLP

## 2020-02-05 NOTE — CASE MANAGEMENT
Called by bedside nurse that family wanted to speak to me, as they are requesting a referral to [facility]  CM with spouse, 2 sons and a LESLEE  There choice is [facility] as a family has had great experience in the past  CM will make a referral to [facility]

## 2020-02-05 NOTE — PLAN OF CARE
Problem: Prexisting or High Potential for Compromised Skin Integrity  Goal: Skin integrity is maintained or improved  Description  INTERVENTIONS:  - Identify patients at risk for skin breakdown  - Assess and monitor skin integrity  - Assess and monitor nutrition and hydration status  - Monitor labs   - Assess for incontinence   - Turn and reposition patient  - Assist with mobility/ambulation  - Relieve pressure over bony prominences  - Avoid friction and shearing  - Provide appropriate hygiene as needed including keeping skin clean and dry  - Evaluate need for skin moisturizer/barrier cream  - Collaborate with interdisciplinary team   - Patient/family teaching  - Consider wound care consult   Outcome: Progressing     Problem: Potential for Falls  Goal: Patient will remain free of falls  Description  INTERVENTIONS:  - Assess patient frequently for physical needs  -  Identify cognitive and physical deficits and behaviors that affect risk of falls  -  Dexter fall precautions as indicated by assessment   - Educate patient/family on patient safety including physical limitations  - Instruct patient to call for assistance with activity based on assessment  - Modify environment to reduce risk of injury  - Consider OT/PT consult to assist with strengthening/mobility  Outcome: Progressing     Problem: Nutrition/Hydration-ADULT  Goal: Nutrient/Hydration intake appropriate for improving, restoring or maintaining nutritional needs  Description  Monitor and assess patient's nutrition/hydration status for malnutrition  Collaborate with interdisciplinary team and initiate plan and interventions as ordered  Monitor patient's weight and dietary intake as ordered or per policy  Utilize nutrition screening tool and intervene as necessary  Determine patient's food preferences and provide high-protein, high-caloric foods as appropriate       INTERVENTIONS:  - Monitor oral intake, urinary output, labs, and treatment plans  - Assess nutrition and hydration status and recommend course of action  - Evaluate amount of meals eaten  - Assist patient with eating if necessary   - Allow adequate time for meals  - Recommend/ encourage appropriate diets, oral nutritional supplements, and vitamin/mineral supplements  - Order, calculate, and assess calorie counts as needed  - Recommend, monitor, and adjust tube feedings and TPN/PPN based on assessed needs  - Assess need for intravenous fluids  - Provide specific nutrition/hydration education as appropriate  - Include patient/family/caregiver in decisions related to nutrition  Outcome: Progressing     Problem: PAIN - ADULT  Goal: Verbalizes/displays adequate comfort level or baseline comfort level  Description  Interventions:  - Encourage patient to monitor pain and request assistance  - Assess pain using appropriate pain scale  - Administer analgesics based on type and severity of pain and evaluate response  - Implement non-pharmacological measures as appropriate and evaluate response  - Consider cultural and social influences on pain and pain management  - Notify physician/advanced practitioner if interventions unsuccessful or patient reports new pain  Outcome: Progressing     Problem: DISCHARGE PLANNING  Goal: Discharge to home or other facility with appropriate resources  Description  INTERVENTIONS:  - Identify barriers to discharge w/patient and caregiver  - Arrange for needed discharge resources and transportation as appropriate  - Identify discharge learning needs (meds, wound care, etc )  - Arrange for interpretive services to assist at discharge as needed  - Refer to Case Management Department for coordinating discharge planning if the patient needs post-hospital services based on physician/advanced practitioner order or complex needs related to functional status, cognitive ability, or social support system  Outcome: Progressing     Problem: Knowledge Deficit  Goal: Patient/family/caregiver demonstrates understanding of disease process, treatment plan, medications, and discharge instructions  Description  Complete learning assessment and assess knowledge base  Interventions:  - Provide teaching at level of understanding  - Provide teaching via preferred learning methods  Outcome: Progressing     Problem: NEUROSENSORY - ADULT  Goal: Achieves stable or improved neurological status  Description  INTERVENTIONS  - Monitor and report changes in neurological status  - Monitor vital signs such as temperature, blood pressure, glucose, and any other labs ordered   - Initiate measures to prevent increased intracranial pressure  - Monitor for seizure activity and implement precautions if appropriate      Outcome: Progressing  Goal: Remains free of injury related to seizures activity  Description  INTERVENTIONS  - Maintain airway, patient safety  and administer oxygen as ordered  - Monitor patient for seizure activity, document and report duration and description of seizure to physician/advanced practitioner  - If seizure occurs,  ensure patient safety during seizure  - Reorient patient post seizure  - Seizure pads on all 4 side rails  - Instruct patient/family to notify RN of any seizure activity including if an aura is experienced  - Instruct patient/family to call for assistance with activity based on nursing assessment  - Administer anti-seizure medications if ordered    Outcome: Progressing  Goal: Achieves maximal functionality and self care  Description  INTERVENTIONS  - Monitor swallowing and airway patency with patient fatigue and changes in neurological status  - Encourage and assist patient to increase activity and self care     - Encourage visually impaired, hearing impaired and aphasic patients to use assistive/communication devices  Outcome: Progressing     Problem: RESPIRATORY - ADULT  Goal: Achieves optimal ventilation and oxygenation  Description  INTERVENTIONS:  - Assess for changes in respiratory status  - Assess for changes in mentation and behavior  - Position to facilitate oxygenation and minimize respiratory effort  - Oxygen administered by appropriate delivery if ordered  - Initiate smoking cessation education as indicated  - Encourage broncho-pulmonary hygiene including cough, deep breathe, Incentive Spirometry  - Assess the need for suctioning and aspirate as needed  - Assess and instruct to report SOB or any respiratory difficulty  - Respiratory Therapy support as indicated  Outcome: Progressing     Problem: SKIN/TISSUE INTEGRITY - ADULT  Goal: Skin integrity remains intact  Description  INTERVENTIONS  - Identify patients at risk for skin breakdown  - Assess and monitor skin integrity  - Assess and monitor nutrition and hydration status  - Monitor labs (i e  albumin)  - Assess for incontinence   - Turn and reposition patient  - Assist with mobility/ambulation  - Relieve pressure over bony prominences  - Avoid friction and shearing  - Provide appropriate hygiene as needed including keeping skin clean and dry  - Evaluate need for skin moisturizer/barrier cream  - Collaborate with interdisciplinary team (i e  Nutrition, Rehabilitation, etc )   - Patient/family teaching  Outcome: Progressing  Goal: Incision(s), wounds(s) or drain site(s) healing without S/S of infection  Description  INTERVENTIONS  - Assess and document risk factors for skin impairment   - Assess and document dressing, incision, wound bed, drain sites and surrounding tissue  - Consider nutrition services referral as needed  - Oral mucous membranes remain intact  - Provide patient/ family education  Outcome: Progressing  Goal: Oral mucous membranes remain intact  Description  INTERVENTIONS  - Assess oral mucosa and hygiene practices  - Implement preventative oral hygiene regimen  - Implement oral medicated treatments as ordered  - Initiate Nutrition services referral as needed  Outcome: Progressing

## 2020-02-05 NOTE — ASSESSMENT & PLAN NOTE
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

## 2020-02-05 NOTE — CONSULTS
Consult Cardiology    Madhav Adams 1940, 70 y o  female MRN: [id number]    Unit/Bed#: -01 Encounter: [id number]    Attending Provider: Boni Cockayne, MD   Primary Care Provider: Rozina Lange DO   Date admitted to hospital: 2/2/2020       Consults    Acute CVA (cerebrovascular accident) [facility])  Assessment & Plan  Acute CVA of unclear etiology  Concern for possible embolic event  Neurology request LUPE as  LUPE was unremarkable  Discussed with family and patient at length last evening as well as this morning  She is agreeable to proceed  Her son Britt Morales is at the bedside and also witnessed and signed consent in addition to the patient  Will plan LUPE later today  Would recommend outpatient placement of loop recorder if concern for underlying arrhythmia  Physician Requesting Consult: Boni Cockayne, MD    Reason for Consult / Principal Problem:  Status post CVA request for LUPE  HPI: Madhav Adams is a 70y o  year old female who has a history of gastroesophageal reflux disease and hypertension  Patient presented to the hospital with a one-day history of cough, dizziness and weakness resulting in several falls  Weakness worsened while in the hospital and suspected CVA was noted 02/03/2020 at which time neurology consult and stroke pathway were initiated  Patient was noted to have right-sided weakness as well as expressive aphasia  MRI showed scattered infarcts in the left MCA and PCA territory  Transthoracic echocardiogram showed no obvious source of emboli/cardiac thrombus  LUPE was requested by Neurology  Review of Systems   Unable to perform ROS: Other   Constitutional: Positive for fatigue  Negative for activity change and appetite change  HENT: Positive for trouble swallowing  Negative for congestion, hearing loss and tinnitus  Eyes: Negative for visual disturbance  Respiratory: Negative for cough, chest tightness, shortness of breath and wheezing  Cardiovascular: Negative for chest pain, palpitations and leg swelling  Gastrointestinal: Negative for abdominal distention, abdominal pain, nausea and vomiting  Musculoskeletal: Negative for arthralgias  Skin: Negative for rash  Neurological: Positive for weakness  Hematological: Does not bruise/bleed easily  Psychiatric/Behavioral: Negative for confusion  The patient is not nervous/anxious  All other systems reviewed and are negative       *limited ROS due to aphasia     Historical Information     Past Medical History:   Diagnosis Date    [facility]cerebral vascular accident) [facility] [address] ) 02/2020    GERD (gastroesophageal reflux disease)     Hypertension      Past Surgical History:   Procedure Laterality Date    CHOLECYSTECTOMY       Social History     Substance and Sexual Activity   Alcohol Use Never    Frequency: Never     Social History     Substance and Sexual Activity   Drug Use Never     Social History     Tobacco Use   Smoking Status Former Smoker   Smokeless Tobacco Never Used       Family History: non-contributory    Meds/Allergies     current meds:   Current Facility-Administered Medications   Medication Dose Route Frequency    acetaminophen (TYLENOL) tablet 650 mg  650 mg Oral Q6H PRN    albuterol inhalation solution 2 5 mg  2 5 mg Nebulization Q6H PRN    aspirin chewable tablet 81 mg  81 mg Oral Daily    atorvastatin (LIPITOR) tablet 40 mg  40 mg Oral QPM    enoxaparin (LOVENOX) subcutaneous injection 40 mg  40 mg Subcutaneous Daily    metoprolol (LOPRESSOR) injection 2 5 mg  2 5 mg Intravenous Q6H    ondansetron (ZOFRAN) injection 4 mg  4 mg Intravenous Q6H PRN    pantoprazole (PROTONIX) EC tablet 40 mg  40 mg Oral Daily    sodium chloride 0 9 % infusion  75 mL/hr Intravenous Continuous       sodium chloride 75 mL/hr Last Rate: 75 mL/hr (02/05/20 1020)       No Known Allergies    Objective     Vitals: Blood pressure (!) 188/99, pulse 83, temperature 98 6 °F (37 °C), temperature source Oral, resp  rate 22, height 5' 7" (1 702 m), weight 68 5 kg (151 lb 0 2 oz), SpO2 96 %  , Body mass index is 23 65 kg/m²  Orthostatic Blood Pressures      Most Recent Value   Blood Pressure  (!) 188/99 filed at 02/05/2020 1140   Patient Position - Orthostatic VS  Sitting filed at 02/05/2020 7766          Systolic (56KRQ), WZD:719 , Min:126 , EDWIN:247     Diastolic (03ACM), EDN:76, Min:60, Max:99        Intake/Output Summary (Last 24 hours) at 2/5/2020 1224  Last data filed at 2/5/2020 1020  Gross per 24 hour   Intake 967 5 ml   Output    Net 967 5 ml       Weight (last 2 days)     Date/Time   Weight    02/05/20 0800   68 5 (151 02)    02/03/20 0200   68 8 (151 68)              Invasive Devices     Peripheral Intravenous Line            Peripheral IV 02/04/20 Dorsal (posterior); Right Wrist 1 day          Drain            External Urinary Catheter 2 days                Physical Exam   Constitutional: She is oriented to person, place, and time  She appears well-developed and well-nourished  HENT:   Head: Normocephalic and atraumatic  Eyes: Pupils are equal, round, and reactive to light  Neck: Normal range of motion  No JVD present  Cardiovascular: Normal rate, regular rhythm and normal heart sounds  Exam reveals no gallop and no friction rub  No murmur heard  Pulmonary/Chest: Effort normal and breath sounds normal  She has no rales  Abdominal: Soft  Musculoskeletal: She exhibits no edema  Neurological: She is alert and oriented to person, place, and time  Speaking slowly expressive aphasia noted at times  Skin: Skin is warm and dry  Psychiatric: She has a normal mood and affect  Her behavior is normal    Vitals reviewed             Laboratory Results:    Results from last 7 days   Lab Units 02/02/20  1916   TROPONIN I ng/mL <0 02       CBC with diff:   Results from last 7 days   Lab Units 02/04/20  0457 02/03/20  0430 02/02/20  1916   WBC Thousand/uL 9 48 10 07 15 08*   HEMOGLOBIN g/dL 14 2 12 9 14 7   HEMATOCRIT % 43 7 41 2 46 2*   MCV fL 80* 81* 81*   PLATELETS Thousands/uL 238 238 277   MCH pg 26 1* 25 2* 25 7*   MCHC g/dL 32 5 31 3* 31 8   RDW % 13 6 13 5 13 3   MPV fL 10 6 11 1 11 1   NRBC AUTO /100 WBCs 0 0 0         CMP:  Results from last 7 days   Lab Units 20  0457 20  0430 20  1916   POTASSIUM mmol/L 4 1 3 7 3 7   CHLORIDE mmol/L 104 104 101   CO2 mmol/L 29 27 28   BUN mg/dL 13 14 19   CREATININE mg/dL 0 70 0 62 0 81   CALCIUM mg/dL 8 6 8 0* 9 0   AST U/L 30  --  21   ALT U/L 21  --  21   ALK PHOS U/L 85  --  96   EGFR ml/min/1 73sq m 84 87 70       BMP:  Results from last 7 days   Lab Units 20  0457 20  0430 20  1916   POTASSIUM mmol/L 4 1 3 7 3 7   CHLORIDE mmol/L 104 104 101   CO2 mmol/L 29 27 28   BUN mg/dL 13 14 19   CREATININE mg/dL 0 70 0 62 0 81   CALCIUM mg/dL 8 6 8 0* 9 0       BNP:  No results for input(s): BNP in the last 72 hours      Magnesium:   Results from last 7 days   Lab Units 20  0457   MAGNESIUM mg/dL 2 0       Coags:       TSH:       Hemoglobin A1C:   Results from last 7 days   Lab Units 20  0457   HEMOGLOBIN A1C % 7 8*       Lipid Profile:   Lab Results   Component Value Date    CHOLESTEROL 197 2020     Lab Results   Component Value Date    HDL 33 (L) 2020     Lab Results   Component Value Date    TRIG 129 2020     No results found for: [place]mo     Cardiac testing:     Results for orders placed during the hospital encounter of 20   Echo complete with contrast if indicated    [address]    Transthoracic Echocardiogram  2D, M-mode, Doppler, and Color Doppler    Study date:  2020    Patient: Bao Juarez  MR number: [id number]  Account number: [de-identified]  : 1940  Age: 70 years  Gender: Female  Status: Inpatient  Location: [facility] Echo Lab  Height: 67 in  Weight: 150 9 lb  BP: 194/ 90 mmHg    Indications: TIA    Diagnoses: G45 9 - Transient cerebral ischemic attack, unspecified    Sonographer:  LUTHER Sewell  Primary Physician:  Ann Hogan DO  Referring Physician:  Cris Perry DO  Group:  [facility]  Interpreting Physician:  Pratik Nicholson DO    IMPRESSIONS:  Technically difficult study  Normal left ventricular size and systolic function  Estimated EF 60-65%  Left atrium is mildly enlarged  Aortic valve appears calcified but with adequate cusp excursion  Trace tricuspid regurgitation  SUMMARY    LEFT VENTRICLE:  Size was normal   Systolic function was normal  Ejection fraction was estimated in the range of 60 % to 65 %  There were no regional wall motion abnormalities  Wall thickness was at the upper limits of normal   Doppler parameters were consistent with abnormal left ventricular relaxation (grade 1 diastolic dysfunction)  RIGHT VENTRICLE:  The size was normal   Systolic function was normal     LEFT ATRIUM:  The atrium was mildly dilated  MITRAL VALVE:  Valve structure was normal   There was no significant regurgitation  TRICUSPID VALVE:  The valve structure was normal   There was trace regurgitation  COMPARISONS:  No prior study for comparison  HISTORY: PRIOR HISTORY: HTN, GERD    PROCEDURE: The study was performed in the [address] [place] [address] Echo Lab  This was a routine study  The transthoracic approach was used  The study included complete 2D imaging, M-mode, complete spectral Doppler, and color Doppler  The heart  rate was 90 bpm, at the start of the study  Images were obtained from the parasternal, apical, subcostal, and suprasternal notch acoustic windows  Echocardiographic views were limited due to restricted patient mobility, poor acoustic  window availability, and decreased penetration  This was a technically difficult study      LEFT VENTRICLE: Size was normal  Systolic function was normal  Ejection fraction was estimated in the range of 60 % to 65 %  There were no regional wall motion abnormalities  Wall thickness was at the upper limits of normal  DOPPLER:  Doppler parameters were consistent with abnormal left ventricular relaxation (grade 1 diastolic dysfunction)  RIGHT VENTRICLE: The size was normal  Systolic function was normal     LEFT ATRIUM: The atrium was mildly dilated  RIGHT ATRIUM: Size was normal     MITRAL VALVE: Valve structure was normal  There was normal leaflet separation  DOPPLER: The transmitral velocity was within the normal range  There was no evidence for stenosis  There was no significant regurgitation  AORTIC VALVE: The valve was not well visualized  DOPPLER: Transaortic velocity was within the normal range  There was no evidence for stenosis  There was no significant regurgitation  TRICUSPID VALVE: The valve structure was normal  There was normal leaflet separation  DOPPLER: The transtricuspid velocity was within the normal range  There was no evidence for stenosis  There was trace regurgitation  PULMONIC VALVE: DOPPLER: There was no significant regurgitation  PERICARDIUM: There was no pericardial effusion  A pericardial fat pad was present  The pericardium was normal in appearance  AORTA: The root exhibited normal size  SYSTEMIC VEINS: IVC: The inferior vena cava was not well visualized      SYSTEM MEASUREMENT TABLES    2D  %FS: 37 33 %  AV Diam: 3 09 cm  EDV(Teich): 155 39 ml  EF(Teich): 66 67 %  ESV(Teich): 51 8 ml  IVSd: 1 15 cm  LA Diam: 3 16 cm  LAAs A4C: 15 31 cm2  LAESV A-L A4C: 37 28 ml  LAESV MOD A4C: 36 57 ml  LALs A4C: 5 34 cm  LVEDV MOD A4C: 74 8 ml  LVEF MOD A4C: 55 96 %  LVESV MOD A4C: 32 94 ml  LVIDd: 5 63 cm  LVIDs: 3 53 cm  LVLd A4C: 6 61 cm  LVLs A4C: 5 9 cm  LVPWd: 0 99 cm  RAAs A4C: 11 17 cm2  RAESV A-L: 19 96 ml  RAESV MOD: 19 27 ml  RALs: 5 3 cm  RVIDd: 2 45 cm  RWT: 0 35  SV MOD A4C: 41 86 ml  SV(Teich): 103 6 ml    CW  AV Vmax: 1 44 m/s  AV maxP 33 mmHg    MM  TAPSE: 2 36 cm    PW  E': 0 07 m/s  E/E': 11 27  MV A [place]: 0 85 m/s  MV Dec [place]: 4 1 m/s2  MV DecT: 203 16 ms  MV E [place]: 0 83 m/s  MV E/A Ratio: 0 99    [employer] Accredited Echocardiography Laboratory    Prepared and electronically signed by    Raymon Lawson DO  Signed 04-Feb-2020 14:38:55       No results found for this or any previous visit  No results found for this or any previous visit  No results found for this or any previous visit  Imaging: I have personally reviewed pertinent reports  Cta Head And Neck W Wo Contrast    Result Date: 2/4/2020  Narrative: CTA NECK AND BRAIN WITH AND WITHOUT CONTRAST INDICATION: Neuro deficit, acute, stroke suspected COMPARISON:   None  TECHNIQUE:  Routine CT imaging of the Brain without contrast   Post contrast imaging was performed after administration of iodinated contrast through the neck and brain  Post contrast axial 0 625 mm images timed to opacify the arterial system  3D rendering was performed on an independent workstation  MIP reconstructions performed  Coronal reconstructions were performed of the noncontrast portion of the brain  Radiation dose length product (DLP) for this visit:  1182 2 mGy-cm   This examination, like all CT scans performed in the [facility], was performed utilizing techniques to minimize radiation dose exposure, including the use of iterative  reconstruction and automated exposure control  IV Contrast:  85 mL of iohexol (OMNIPAQUE)  IMAGE QUALITY:   Diagnostic FINDINGS: NONCONTRAST BRAIN PARENCHYMA:  Low-density paramedian left posterior frontal and anterior parietal lobe compatible with sites of recent infarct seen on MRI  Low-density periventricular regions compatible with chronic microangiopathic disease  No mass effect or midline shift  No hemorrhage identified  VENTRICLES AND EXTRA-AXIAL SPACES:  Normal for the patient's age  VISUALIZED ORBITS AND PARANASAL SINUSES:  Unremarkable   CERVICAL VASCULATURE AORTIC ARCH AND GREAT VESSELS:  Atheromatous plaque along the ascending aortic arch  RIGHT VERTEBRAL ARTERY CERVICAL SEGMENT:  Normal origin  There is atheromatous plaque resulting in moderate stenosis of the proximal vertebral artery seen best on series 5 image 405 just prior to entering the foramen transversarium at C7  The vessel is  normal in caliber throughout the neck  LEFT VERTEBRAL ARTERY CERVICAL SEGMENT:  Normal origin  The vessel is normal in caliber throughout the neck  RIGHT EXTRACRANIAL CAROTID SEGMENT:  Normal caliber common carotid artery  Normal bifurcation and cervical internal carotid artery  No stenosis or dissection  LEFT EXTRACRANIAL CAROTID SEGMENT:  Normal caliber common carotid artery  Normal bifurcation and cervical internal carotid artery  No stenosis or dissection  NASCET criteria was used to determine the degree of internal carotid artery diameter stenosis  INTRACRANIAL VASCULATURE INTERNAL CAROTID ARTERIES:  Normal enhancement of the intracranial portions of the internal carotid arteries  Normal ophthalmic artery origins  Normal ICA terminus  ANTERIOR CIRCULATION:  Symmetric A1 segments and anterior cerebral arteries with normal enhancement  Normal anterior communicating artery  MIDDLE CEREBRAL ARTERY CIRCULATION:  M1 segment and middle cerebral artery branches demonstrate normal enhancement bilaterally  DISTAL VERTEBRAL ARTERIES:  Normal distal vertebral arteries  Posterior inferior cerebellar artery origins are normal  Normal vertebral basilar junction  BASILAR ARTERY:  Basilar artery is normal in caliber  Normal superior cerebellar arteries  POSTERIOR CEREBRAL ARTERIES: Both posterior cerebral arteries arises from the basilar tip  Both arteries demonstrate normal enhancement  Normal posterior communicating arteries  DURAL VENOUS SINUSES:  Normal  NON VASCULAR ANATOMY BONY STRUCTURES:  No acute osseous abnormality   SOFT TISSUES OF THE NECK:  Normal  THORACIC INLET:  Unremarkable  Impression: No significant carotid or vertebral artery stenosis  No focal intracranial stenosis or aneurysm  Complete [place]  Evolving infarcts paramedian left frontoparietal junction  Workstation performed: [id number]     Xr Chest Portable    Result Date: 2/4/2020  Narrative: CHEST INDICATION:   hypoxia  COMPARISON:  2/2/2020 chest x-ray EXAM PERFORMED/VIEWS:  XR CHEST PORTABLE Single portable view FINDINGS: Cardiomediastinal silhouette appears unremarkable  The lungs are clear  Left basal subsegmental atelectasis has resolved  No pneumothorax or pleural effusion  Osseous structures appear within normal limits for patient age  Impression: No acute cardiopulmonary disease, improved  Workstation performed: [id number]     Xr Chest 2 Views    Result Date: 2/3/2020  Narrative: CHEST INDICATION:   sob  COMPARISON:  None EXAM PERFORMED/VIEWS:  XR CHEST PA & LATERAL FINDINGS: Cardiomediastinal silhouette appears unremarkable  Discoid atelectasis left base  No pneumothorax or pleural effusion  Osseous structures appear within normal limits for patient age  Impression: Discoid atelectasis left base  Workstation performed: [id number]     Xr Shoulder 2+ Views Right    Result Date: 2/3/2020  Narrative: RIGHT SHOULDER INDICATION:   fall  COMPARISON:  None VIEWS:  XR SHOULDER 2+ VW RIGHT Images: 5 FINDINGS: There is no acute fracture or dislocation  Mild degenerative arthritis involves the acromioclavicular and glenohumeral joints  Tiny fragment projects adjacent to the posterior glenoid consistent with old avulsion  No lytic or blastic lesions are seen  Soft tissues are unremarkable  Impression: Mild degenerative arthritis No acute osseous abnormality  Findings are consistent with emergency provider's preliminary reading Workstation performed: [id number]     Xr Knee 4+ Vw Left Injury    Result Date: 2/3/2020  Narrative: LEFT KNEE INDICATION:   fall   COMPARISON:  None VIEWS:  XR KNEE 4+ VW LEFT INJURY Images: 4 FINDINGS: There is no acute fracture or dislocation  There is no joint effusion  No significant degenerative changes  No lytic or blastic lesions are seen  Soft tissues are unremarkable  Impression: No acute osseous abnormality  Findings are consistent with emergency provider's preliminary reading Workstation performed: [id number]     Xr Knee 4+ Vw Right Injury    Result Date: 2/3/2020  Narrative: RIGHT KNEE INDICATION:   fall  COMPARISON:  None VIEWS:  XR KNEE 4+ VW RIGHT INJURY Images: 4 FINDINGS: There is no acute fracture or dislocation  There is no joint effusion  Mild degenerative changes involve the medial and patellofemoral compartments  No lytic or blastic lesions are seen  Soft tissues are unremarkable  Impression: Mild degenerative arthritis No acute osseous abnormality  Findings are consistent with emergency provider's preliminary reading Workstation performed: [id number]     Ct Head Without Contrast    Result Date: 2/2/2020  Narrative: CT BRAIN - WITHOUT CONTRAST INDICATION:   Altered mental status  COMPARISON:  None  TECHNIQUE:  CT examination of the brain was performed  In addition to axial images, coronal 2D reformatted images were created and submitted for interpretation  Radiation dose length product (DLP) for this visit:  836 mGy-cm   This examination, like all CT scans performed in the [facility], was performed utilizing techniques to minimize radiation dose exposure, including the use of iterative reconstruction and automated exposure control  IMAGE QUALITY:  Diagnostic  FINDINGS: PARENCHYMA: Decreased attenuation is noted in periventricular and subcortical white matter demonstrating an appearance that is statistically most likely to represent moderate microangiopathic change  No CT signs of acute infarction  No intracranial mass, mass effect or midline shift  No acute parenchymal hemorrhage   VENTRICLES AND EXTRA-AXIAL SPACES:  Normal for the patient's age  VISUALIZED ORBITS AND PARANASAL SINUSES:  Unremarkable  CALVARIUM AND EXTRACRANIAL SOFT TISSUES:  Normal      Impression: No acute intracranial abnormality  Workstation performed: [id number]     Mri Brain Wo Contrast    Result Date: 2/4/2020  Narrative: MRI BRAIN WITHOUT CONTRAST INDICATION: worsening expressive aphasia  COMPARISON:   MRI of the brain from February 3, 2030 and intervening CTA of the head and neck from February 4, 2020  TECHNIQUE:  Sagittal T1, axial T2, axial FLAIR, axial T1, axial [place] and axial diffusion imaging  IMAGE QUALITY:  Diagnostic  FINDINGS: BRAIN PARENCHYMA:  Interval increase in the amount of cortical restricted diffusion and FLAIR signal abnormality abnormality noted in the left parasagittal posterior frontal lobe, now involving the cingulate gyrus  The multiple focal areas of restricted  diffusion in the left parasagittal parietal lobe has not significantly changed  Findings are consistent with interval progression of the nonhemorrhagic left BUZZ territory infarction  The nonhemorrhagic PCA territory infarcts are stable in appearance  Chronic lacunar infarctions within the bilateral thalami, lentiform nuclei and corona radiata  No cerebellar signal abnormality is identified  Scattered punctate foci of cortical gradient susceptibility artifact likely represent amyloid angiopathy  Moderate cerebral and mild pontine chronic microangiopathic changes  VENTRICLES:  Mild stable lateral ventriculomegaly, likely secondary to central parenchymal volume loss  SELLA AND PITUITARY GLAND:  Normal  ORBITS:  Normal  PARANASAL SINUSES:  Rightward nasal septal deviation  Clear paranasal sinuses  VASCULATURE:  Diminutive left intradural vertebral artery flow-void  Right vertebral basilar artery flow voids are maintained  Intracranial carotid artery flow voids are preserved  The left peripheral BUZZ flow voids are not visualized  The right remain patent  Proximal PCA flow voids are preserved  CALVARIUM AND SKULL BASE:  Normal  EXTRACRANIAL SOFT TISSUES:  Normal      Impression: Interval extension of the nonhemorrhagic left BUZZ territory infarction involving the parasagittal left posterior frontal lobe now extending to the cingulate gyrus  Stable PCA territory parasagittal parietal infarctions  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  I personally discussed this study with Dr Denise Voss on 2/4/2020 at 2:57 PM  Workstation performed: [id number]     Mri Brain Wo Contrast    Result Date: 2/3/2020  Narrative: MRI BRAIN WITHOUT CONTRAST INDICATION: poss stroke  COMPARISON:   None  TECHNIQUE:  Sagittal T1, axial T2, axial FLAIR, axial T1, axial Pease and axial diffusion imaging  IMAGE QUALITY:  Diagnostic  FINDINGS: BRAIN PARENCHYMA:  There are multiple foci of diffusion restriction in the left cerebral hemisphere involving the parasagittal left frontal parietal region, posterior body of  corpus callosum, parasagittal posterior left parietal region Superficial cortical infarct are also noted in the left parietal region at the level of the central sulcus,  postcentral gyrus  There are moderate periventricular and white matter T2 hyperintensities There is no acute hemorrhage seen Scattered subcortical foci of  susceptibility noted in the posterior left temporal region, left parietal region, medial right temporal region, may be related to hypertensive microangiopathy Moderate to periventricular and white matter T2 hyperintensity seen related to chronic small vessel ischemic changes Chronic small vessel ischemic changes in the bettie VENTRICLES:  Normal for the patient's age  SELLA AND PITUITARY GLAND:  Normal  ORBITS:  Normal  PARANASAL SINUSES:  Normal  VASCULATURE:  Evaluation of the major intracranial vasculature demonstrates appropriate flow voids   CALVARIUM AND SKULL BASE:  Normal  EXTRACRANIAL SOFT TISSUES:  Normal      Impression: Multiple superficial cortical infarct in the parasagittal left frontal, parietal region and in the precentra,postcentral gyrus,  in the distribution of the left anterior cerebral artery, middle cerebral artery, probably embolic No acute hemorrhage seen Multiple hypointense foci  foot hemosiderin deposition in the subcortical region in the parietal region, temporal region may related to hypertensive microangiopathy other less likely etiology could include amyloid angiopathy  I personally discussed this study with Susan Acuña on 2/3/2020 at 4:20 PM  Workstation performed: [id number]     Vas Carotid Complete Study    Result Date: 2/3/2020  Narrative:  [facility] REPORT CLINICAL: Indications: Patient presents with to evaluate for carotid artery stenosis s/p recent TIA/CVA  Operative History: Denies cardiovascular intervention Risk Factors The patient has history of HTN  FINDINGS:  Right        Impression  PSV  EDV (cm/s)  Direction of Flow  Ratio  Dist  ICA                 91          16                      0 90  Mid  ICA                  91          19                      0 90  Prox  ICA    1 - 49%      87          14                      0 87  Dist CCA                  78          11                            Mid CCA                  100          18                      1 08  Prox CCA                  93          18                      0 40  Ext Carotid              114           7                      1 14  Prox Vert                 80          12  Antegrade                 Subclavian               196          15                            Innominate               235           0                             Left         Impression  PSV  EDV (cm/s)  Direction of Flow  Ratio  Dist  ICA                 83          18                      0 85  Mid  ICA                  84          17                      0 86  Prox   ICA    1 - 49%      77          13                      0 79  Dist CCA                  85 10                            Mid CCA                   98          17                      0 98  Prox CCA                 100          15                            Ext Carotid               96          10                      0 98  Prox Vert                120          17  Antegrade                 Subclavian               151          12                               CONCLUSION:  Impression RIGHT: There is <50% stenosis noted in the internal carotid artery  Plaque is homogenous and smooth  Vertebral artery flow is antegrade  There is no significant subclavian artery disease  LEFT: There is <50% stenosis noted in the internal carotid artery  Plaque is homogenous and smooth  Vertebral artery flow is antegrade  There is no significant subclavian artery disease  There are not any previous studies for comparison  Internal carotid artery stenosis determination by consensus criteria from: Samaria Juan et al  Carotid Artery Stenosis: Gray-Scale and Doppler US Diagnosis - [employer] in [address] [place] [address], Radiology 2003; [id number]  SIGNATURE: Electronically Signed by: Larisa Lynne on 2020-02-03 04:15:42 PM      Telemetry:  Normal sinus rhythm  Counseling / Coordination of Care  Total floor / unit time spent today 30 minutes  Greater than 50% of total time was spent with the patient and / or family counseling and / or coordination of care  A description of the counseling / coordination of care:  Discussed indications for LUPE with patient and family at bedside 02/04/2020  Juan Perez was present this morning 02/05/2020 and we reviewed once again the indications for LUPE  The patient is agreeable to proceed and consent was obtained by both the patient and her son given her expressive aphasia  LUPE planned for later today           Code Status: Level 1 - Full Code

## 2020-02-06 PROCEDURE — 93312 ECHO TRANSESOPHAGEAL: CPT | Performed by: INTERNAL MEDICINE

## 2020-02-06 PROCEDURE — 97112 NEUROMUSCULAR REEDUCATION: CPT

## 2020-02-06 PROCEDURE — 93325 DOPPLER ECHO COLOR FLOW MAPG: CPT | Performed by: INTERNAL MEDICINE

## 2020-02-06 PROCEDURE — 99233 SBSQ HOSP IP/OBS HIGH 50: CPT | Performed by: INTERNAL MEDICINE

## 2020-02-06 PROCEDURE — 97530 THERAPEUTIC ACTIVITIES: CPT

## 2020-02-06 PROCEDURE — 93320 DOPPLER ECHO COMPLETE: CPT | Performed by: INTERNAL MEDICINE

## 2020-02-06 PROCEDURE — 92526 ORAL FUNCTION THERAPY: CPT

## 2020-02-06 RX ORDER — LISINOPRIL 2.5 MG/1
2.5 TABLET ORAL DAILY
Status: DISCONTINUED | OUTPATIENT
Start: 2020-02-06 | End: 2020-02-07

## 2020-02-06 RX ADMIN — METOPROLOL TARTRATE 2.5 MG: 5 INJECTION, SOLUTION INTRAVENOUS at 16:20

## 2020-02-06 RX ADMIN — METOPROLOL TARTRATE 2.5 MG: 5 INJECTION, SOLUTION INTRAVENOUS at 03:15

## 2020-02-06 RX ADMIN — ATORVASTATIN CALCIUM 40 MG: 40 TABLET, FILM COATED ORAL at 18:31

## 2020-02-06 RX ADMIN — LISINOPRIL 2.5 MG: 2.5 TABLET ORAL at 09:15

## 2020-02-06 RX ADMIN — SODIUM CHLORIDE 75 ML/HR: 0.9 INJECTION, SOLUTION INTRAVENOUS at 05:35

## 2020-02-06 RX ADMIN — ASPIRIN 81 MG 81 MG: 81 TABLET ORAL at 08:25

## 2020-02-06 RX ADMIN — METOPROLOL TARTRATE 2.5 MG: 5 INJECTION, SOLUTION INTRAVENOUS at 20:37

## 2020-02-06 RX ADMIN — ENOXAPARIN SODIUM 40 MG: 40 INJECTION SUBCUTANEOUS at 08:25

## 2020-02-06 RX ADMIN — METOPROLOL TARTRATE 2.5 MG: 5 INJECTION, SOLUTION INTRAVENOUS at 08:25

## 2020-02-06 RX ADMIN — PANTOPRAZOLE SODIUM 40 MG: 40 TABLET, DELAYED RELEASE ORAL at 08:25

## 2020-02-06 NOTE — PLAN OF CARE
Problem: OCCUPATIONAL THERAPY ADULT  Goal: Performs self-care activities at highest level of function for planned discharge setting  See evaluation for individualized goals  Description  Treatment Interventions: ADL retraining, Functional transfer training, UE strengthening/ROM, Endurance training, Patient/family training, Equipment evaluation/education, Neuromuscular reeducation, Fine motor coordination activities, Compensatory technique education, Continued evaluation, UE splinting, Energy conservation, Activityengagement          See flowsheet documentation for full assessment, interventions and recommendations  Outcome: Progressing  Note:   Limitation: Decreased ADL status, Decreased UE ROM, Decreased UE strength, Decreased Safe judgement during ADL, Decreased endurance, Decreased sensation, Decreased fine motor control, Decreased self-care trans, Decreased high-level ADLs, Non-func R UE  Prognosis: Fair, Good  Assessment: Pt seen for treatment session #2 this date  Pt alert and agreeable to participate in therapy at this time  Therapy to focus on functional transfers, sitting balance, and activity tolerance  Pt completing supine>sit @ Max A  Pt sitting EOB while maintaining P+ balance, Pt continues with posterior and R lateral lean  Pt with slight improvement in balance since yesterday, requiring consistent Min-Mod A  During unsupported sitting Pt weight baring through RUE to gain feedback  Therapist encouraging forward flexion to challenge Pt's core strength, Pt requiring Max A to regain neutral  Pt then requiring Max A for STS with no AD  Pt's B knees blocked, SPT to kari-paresis side, Total A for BLE progression and pivot to recliner  Pt requiring Max A for repositioning in recliner  Minimal contraction noted in RUE bicep/tricep this date  Pt seated upright in recliner with good postural control at this time   Pt requires PT /OT co-treat due to signficant assistance with balance and R side impairments as well as to maximize Pt's performance, participation, and functional outcomes    Recommendation: (post acute rehab)  OT Discharge Recommendation: (post acute rehab)

## 2020-02-06 NOTE — SPEECH THERAPY NOTE
Speech Language/Pathology    Recommended diet: Regular with Thin Liquids  Recommended strategies: No straw, small single sips via cup, intermittent dry swallow    RN reporting tolerance of upgraded diet with meal completions >75%  Daughter present this date and reviewed results from VBS last date, understanding verbalized  Plan is to discharge current or next date to [facility] at [facility]  Reviewed dysphagia compensatory strategies with pt  Reports visual aid facilitates recall and usage  During snack, pt required min A verbal prompts and reminders for dry swallow and supervision level for no straws and single sips  Additional education provided on function of dry swallow, understanding verbalized  Volitional swallow continues to require extra effort/timing  Regular trials (pretzels) x7 yielded functional mastication, bolus formation, a-p transfer, and slightly delayed but functional swallow initiation  Worked on effortful quick swallow following breakdown due to bolus hold and VBS pooling of portion of bolus to vallecula  Increased swallow initiation with instruction  Thin liquids via cup controlled sip x11 yielded no overt s/s of aspiration  Pt has a chronic >10 year cough and mucous that was present at rest while SLP documenting  Family also agrees that pt has had that prior and suspect relation to hx of smoking  Pending completion of speech/language and cognitive assessment  Anticipate completion at ARU  Primary focus since start of care, establishment of LRD      [address]lecio, CCC-SLP

## 2020-02-06 NOTE — PROCEDURES
Video Swallow Study      Patient Name: Angélica Almaraz  ZIXHE'T Date: 2/5/2020        Past Medical History  Past Medical History:   Diagnosis Date    CVA (cerebral vascular accident) ([address] ) 02/2020    GERD (gastroesophageal reflux disease)     Hypertension         Past Surgical History  Past Surgical History:   Procedure Laterality Date    CHOLECYSTECTOMY           General Information:  Bedside dysphagia evaluation completed on 2/4/2020 and treatment session of 2/5/2020  NPO and VBS recommended based upon following concerns: increased SOB with po intake, delayed swallow per laryngeal palpation, s/s of aspiration via variable productive vs nonproductive cough, and acute evolving CVA  Trials Included:  Puree 3  NTL cup fed and self fed 3  Thin cup fed and self fed 5  Thin straw self fed 4  Mech soft 1  Soft solid 1  Hard solid 2    Oral Stage:  Pt presented with mild oral dysphagia characterized by reduced oral agility  Trials of mech soft, soft solid, and hard solid yielded slight prolonged mastication but with additional time, functional with reduced bolus formation via stasis on lingum  Pt aware of retention and able to cleared with liquid wash vs additional swallow or trial  No significant pocketing from slight right sided facial weakness  Pharyngeal Stage:  Pt presented with mild pharyngeal dysphagia characterized by reduced anterior motion of hyoid bone, reduced tongue base retraction, diminished epiglottic inversion, and impaired airway protection  All trials yielded pooling to the level of the vallecula and based upon pt's age is appropriate  Slight pooling to the pyriform sinuses with thin  All liquid and solids trials yielded mild vallecular residuals which mildly cleared with dry swallow  Liquid wash and additional trials ineffective in facilitating clearance  With subsequent trials, vallecular residuals did not worsen   Mech soft, soft solid, and hard solids yielded base of tongue residuals pooling to the vallecula  Again did not worsening or increase through additional trials and dry swallow effective in reducing but not eliminating  Functional swallow with NTL  Thin liquids via cup small single sip yielded adequate airway protection  Thin via straw due to enlarged bolus size yielded 1 instance of supraglottic penetration  Cleared with prompted throat clear and swallow  Pt did require additional time and visual model to complete volitional throat clear  Esophageal Stage:   No s/s of disease  Assessment Summary: Pt presents with mild oropharyngeal dysphagia  Deficits are minimal and able to be eliminated through compensatory strategies that include: no straws, small sips, and intermittent dry swallow  Discussed results with pt and utilized clips of study to aid in understanding  Also reviewed swallow function and recommended diet with multiple family members  Visual aid placed behind bed regarding strategies and recommendations  Also written aid taped on bedside table to facilitate recall of effective strategies  Recommendations: Recommend diet level regular with thin liquids  Reviewed with RN, who attended study for pt monitoring, and MD via [facility] Text  Dysphagia tx to provide further education on compensatory strategies and complete oral and pharyngeal exercises to improve strength and coordination          [address] Donny Majano, MICAELA-SLP

## 2020-02-06 NOTE — PLAN OF CARE
Problem: PHYSICAL THERAPY ADULT  Goal: Performs mobility at highest level of function for planned discharge setting  See evaluation for individualized goals  Description  Treatment/Interventions: Functional transfer training, LE strengthening/ROM, Elevations, Therapeutic exercise, Endurance training, Patient/family training, Equipment eval/education, Bed mobility, Gait training, Compensatory technique education, Spoke to nursing, OT, Spoke to case management  Equipment Recommended: (TBD)       See flowsheet documentation for full assessment, interventions and recommendations  Outcome: Progressing  Note:   Prognosis: Good  Problem List: Decreased strength, Decreased range of motion, Decreased endurance, Impaired balance, Decreased mobility, Decreased coordination, Decreased cognition, Impaired judgement, Decreased safety awareness, Impaired sensation, Impaired tone, Obesity, Pain  Assessment: pt progressing well w/ increased activity tolerance today- able to tolerate increased time at EOB- participated and is motivated throughout  weightshifting to hemiparetic side in seated as well as stadning positions w/ UE shouler supported and knee blocked  pt unable to advance R LE for functional steps- however w/ some increased R LE tone and assisting w/ upright possture w/ manual weight shift and facilitation- pt will cont to benefit from extensive comprehensive rehab program to maximize recovery s/p CVA   will cont to follw and progress  Recommendation: Post acute IP rehab     PT - OK to Discharge: Yes(to rehab )    See flowsheet documentation for full assessment

## 2020-02-06 NOTE — OCCUPATIONAL THERAPY NOTE
OccupationalTherapy Progress Note     Patient Name: Madhav Adams  Today's Date: 2/6/2020  Problem List  Principal Problem:    Acute ischemic cerebrovascular accident (CVA) involving left middle cerebral artery territory [facility])  Active Problems:    Essential hypertension    Fall    Gastroesophageal reflux disease without esophagitis    Generalized weakness    Respiratory distress    Acute CVA (cerebrovascular accident) ([facility] )            02/06/20 1002   Restrictions/Precautions   Weight Bearing Precautions Per Order No   Other Precautions Chair Alarm; Bed Alarm;Limb alert; Fall Risk   Pain Assessment   Pain Assessment No/denies pain   Pain Score No Pain   Bed Mobility   Supine to Sit 2  Maximal assistance   Additional items Assist x 1; Increased time required;Verbal cues;LE management   Transfers   Sit to Stand 2  Maximal assistance   Additional items Assist x 1; Increased time required;Verbal cues   Stand to Sit 2  Maximal assistance   Additional items Assist x 1; Increased time required;Verbal cues   Stand pivot 2  Maximal assistance   Additional items Assist x 1; Increased time required;Verbal cues   Additional Comments Max x's 1 with STS from EOB with no AD, squat pivot form  Pre gait weight shifting, Pt unable to progress LE  Max A for stand pivot to recliner  Cognition   Overall Cognitive Status WFL   Arousal/Participation Alert; Responsive; Cooperative   Attention Within functional limits   Orientation Level Oriented X4   Memory Within functional limits   Following Commands Follows one step commands without difficulty   Activity Tolerance   Activity Tolerance Patient limited by fatigue   Medical Staff Made Aware Spoke with RN, Silvia Painting  Spoke with PT, Marianne Hallman  Assessment   Assessment Pt seen for treatment session #2 this date  Pt alert and agreeable to participate in therapy at this time  Therapy to focus on functional transfers, sitting balance, and activity tolerance  Pt completing supine>sit @ Max A   Pt sitting EOB while maintaining P+ balance, Pt continues with posterior and R lateral lean  Pt with slight improvement in balance since yesterday, requiring consistent Min-Mod A  During unsupported sitting Pt weight baring through RUE to gain feedback  Therapist encouraging forward flexion to challenge Pt's core strength, Pt requiring Max A to regain neutral  Pt then requiring Max A for STS with no AD  Pt's B knees blocked, SPT to kari-paresis side, Total A for BLE progression and pivot to recliner  Pt requiring Max A for repositioning in recliner  Minimal contraction noted in RUE bicep/tricep this date  Pt seated upright in recliner with good postural control at this time  Pt requires PT /OT co-treat due to signficant assistance with balance and R side impairments as well as to maximize Pt's performance, participation, and functional outcomes  Plan   Treatment Interventions ADL retraining;Functional transfer training;UE strengthening/ROM; Endurance training;Cognitive reorientation;Patient/family training;Equipment evaluation/education; Neuromuscular reeducation; Fine motor coordination activities; Compensatory technique education;Continued evaluation;UE splinting; Energy conservation; Activityengagement   Goal Expiration Date 02/19/20   OT Treatment Day 2   OT Frequency 3-5x/wk   Recommendation   Recommendation   (post acute rehab)   OT Discharge Recommendation   (post acute rehab)       Pt goals that were establish on initial evaluation continue to be appropriate at this time  Pt goals to be met by 2/19/2020     1  Pt will demonstrate ability to complete grooming/hygiene tasks @ S after set-up after set-up  2  Pt will demonstrate ability to complete supine<>sit @ Min A in order to increase safety and independence during ADL tasks    3  Pt will demonstrate ability to complete UB ADLs including washing/dressing @ Min A with use of compensatory strategies PRN in order to increase performance and participation during meaningful tasks  4  Pt will demonstrate ability to sit unsupported at EOB for 10+ minutes while maintaining F balance in order to increase independence and participation during meaningful tasks  5  Pt will demonstrate ability to complete EOB, chair, toilet/commode transfers @ Max A x's 1 in order to increase performance and participation during functional tasks  6  Pt will demonstrate ability to stand for 5 minutes while maintaining F balance with use of kari-walker or RW for UB support PRN in order to increase performance and reduce dependency on caregivers during LB ADLs  7  Pt will demonstrate Good carryover of RUE self ROM exercises to complete with assistance from LUE in order to maintain current ROM and prevent contractures  8  Pt will increase MS in RUE to 2/5 demonstrated by ability to complete elbow flexion/extension with gravity eliminated in order to increase performance and participation during meaningful tasks  9  Pt will demonstrate ability to tolerate 30-35 minute OT session with no vc'ing for deep breathing or use of energy conservation techniques in order to increase activity tolerance during functional tasks  10  Pt will demonstrate Good carryover of use of energy conservation/compensatory strategies during ADLs and functional tasks in order to increase safety and reduce risk for falls  11  Pt will demonstrate Good attention and participation in continued evaluation of functional ambulation house hold distances in order to assist with safe d/c planning  12  Pt will attend to continued cognitive assessments 100% of the time in order to provide most appropriate d/c recommendations  13  Pt will identify 3 areas of interest/hobbies and 1 intervention on how to incorporate into daily life in order to increase interaction with environment and peers as well as increase participation in meaningful tasks     14  Pt will demonstrate 100% carryover of BUE HEP in order to increase BUE MS and increase performance during functional tasks upon d/c home      Elena Rider, OTR/L

## 2020-02-06 NOTE — RESPIRATORY THERAPY NOTE
RT Protocol Note  Zoran Miranda 70 y o  female MRN: [id number]  Unit/Bed#: -01 Encounter: [id number]    Assessment    Principal Problem:    Acute ischemic cerebrovascular accident (CVA) involving left middle cerebral artery territory [facility])  Active Problems:    Essential hypertension    Fall    Gastroesophageal reflux disease without esophagitis    Generalized weakness    Respiratory distress    Acute CVA (cerebrovascular accident) ([facility] )      Home Pulmonary Medications:  None       Past Medical History:   Diagnosis Date    CVA (cerebral vascular accident) ([place] [id number] ) 02/2020    GERD (gastroesophageal reflux disease)     Hypertension      Social History     Socioeconomic History    Marital status: /Civil Union     Spouse name: None    Number of children: None    Years of education: None    Highest education level: None   Occupational History    None   Social Needs    Financial resource strain: None    Food insecurity:     Worry: None     Inability: None    Transportation needs:     Medical: None     Non-medical: None   Tobacco Use    Smoking status: Former Smoker    Smokeless tobacco: Never Used   Substance and Sexual Activity    Alcohol use: Never     Frequency: Never    Drug use: Never    Sexual activity: None   Lifestyle    Physical activity:     Days per week: None     Minutes per session: None    Stress: None   Relationships    Social connections:     Talks on phone: None     Gets together: None     Attends [employer] service: None     Active member of club or organization: None     Attends meetings of clubs or organizations: None     Relationship status: None    Intimate partner violence:     Fear of current or ex partner: None     Emotionally abused: None     Physically abused: None     Forced sexual activity: None   Other Topics Concern    None   Social History Narrative    None       Subjective    Subjective Data: (P) Pt states breathing is good       Objective    Physical Exam: Assessment Type: (P) Assess only  General Appearance: (P) Alert, Awake  Respiratory Pattern: (P) Normal  Chest Assessment: (P) Chest expansion symmetrical  Bilateral Breath Sounds: (P) Diminished  Cough: (P) Congested, Non-productive, Strong  O2 Device: (P) RA    Vitals:  Blood pressure (!) 193/107, pulse 79, temperature 97 9 °F (36 6 °C), temperature source Oral, resp  rate (P) 20, height 5' 7" (1 702 m), weight 68 5 kg (151 lb), SpO2 93 %  Imaging and other studies: I have personally reviewed pertinent reports  O2 Device: (P) RA     Plan    Respiratory Plan: (P) Discontinue Protocol, No distress/Pulmonary history        Resp Comments: (P) Pt is not in any respiratory distress, no wheezing, denies SOB  Pt does not take any home respiratory meds  Quit smoking over 20yr ago  Here for CVA  Respiratory protocol D/C'd at this time

## 2020-02-06 NOTE — PROGRESS NOTES
Progress Note - Herminio Blorah 1940, 70 y o  female MRN: [id number]    Unit/Bed#: -01 Encounter: [id number]    Primary Care Provider: Gee Solitario DO   Date and time admitted to hospital: 2/2/2020  6:44 PM    * Acute ischemic cerebrovascular accident (CVA) involving left middle cerebral artery territory [facility])  Assessment & Plan  Acute CVA involving left middle cerebral artery territory as well as left anterior cerebral artery territory  Repeat MRI done for worsening aphasia showed Interval extension of the nonhemorrhagic left BUZZ territory infarction involving the parasagittal left posterior frontal lobe now extending to the cingulate gyrus  Discussed with neurology and suggested to continue with aspirin and statin for now  Telemetry showed no significant event  TTE/LUPE showed no significant clot or PFO  Strong suspicion for cardioembolic stroke  Patient will require loop recorder  Loop recurred will be arranged by cardiology clinic as an outpatient  VF suggested regular diet with thin liquids  PTOT appreciated  Patient is medically stable for discharge  Continue VTE prophylaxis with Lovenox 40 mg subcu daily  Essential hypertension  Assessment & Plan  More than 48 hours post acute stroke  BP still elevated  Lisinopril 2 5 mg started today  Keep systolic blood pressure between 160 and 180 and slowly manage blood pressure as an outpatient with a goal of BP less than 130/80      VTE Pharmacologic Prophylaxis:   Pharmacologic: Enoxaparin (Lovenox)    Patient Centered Rounds: I have performed bedside rounds with nursing staff today    Discussions with Specialists or Other Care Team Provider:     Education and Discussions with Family / Patient:     Current Length of Stay: 4 day(s)    Current Patient Status: Inpatient   Certification Statement: The patient will continue to require additional inpatient hospital stay due to Acute CVA, awaiting placement    Discharge Plan:  Probably tomorrow    Code Status: Level 1 - Full Code      Subjective:   No events overnight  Objective:     Vitals:   Temp (24hrs), Av °F (36 7 °C), Min:97 3 °F (36 3 °C), Max:98 8 °F (37 1 °C)    Temp:  [97 3 °F (36 3 °C)-98 8 °F (37 1 °C)] 98 °F (36 7 °C)  HR:  [76-93] 84  Resp:  [16-24] 20  BP: (168-209)/() 189/91  SpO2:  [93 %-96 %] 95 %  Body mass index is 23 65 kg/m²  Input and Output Summary (last 24 hours):        Intake/Output Summary (Last 24 hours) at 2020 1353  Last data filed at 2020 1301  Gross per 24 hour   Intake 2196 25 ml   Output 1250 ml   Net 946 25 ml       Physical Exam:     General appearance: alert, appears stated age and cooperative  Head: Normocephalic, without obvious abnormality, atraumatic  Lungs: clear to auscultation bilaterally  Heart: regular rate and rhythm  Abdomen: soft, non-tender, positive bowel sounds   Back: negative  Extremities: extremities atraumatic, no cyanosis or edema  Neurologic: Motor: Right upper and lower extremity plegia, expressive aphasia, right lower facial palsy    Additional Data:     Labs:    Results from last 7 days   Lab Units 20  0457   WBC Thousand/uL 9 48   HEMOGLOBIN g/dL 14 2   HEMATOCRIT % 43 7   PLATELETS Thousands/uL 238   NEUTROS PCT % 74   LYMPHS PCT % 15   MONOS PCT % 10   EOS PCT % 1     Results from last 7 days   Lab Units 20  0457   SODIUM mmol/L 139   POTASSIUM mmol/L 4 1   CHLORIDE mmol/L 104   CO2 mmol/L 29   BUN mg/dL 13   CREATININE mg/dL 0 70   ANION GAP mmol/L 6   CALCIUM mg/dL 8 6   ALBUMIN g/dL 3 2*   TOTAL BILIRUBIN mg/dL 0 69   ALK PHOS U/L 85   ALT U/L 21   AST U/L 30   GLUCOSE RANDOM mg/dL 191*         Results from last 7 days   Lab Units 20  1909   POC GLUCOSE mg/dl 136     Results from last 7 days   Lab Units 20  0457   HEMOGLOBIN A1C % 7 8*     Results from last 7 days   Lab Units 20  0959 20  0430 20  0228 20  1919   LACTIC ACID mmol/L  --   --   --  1 6   PROCALCITONIN ng/ml <0 05 0 05 <0 05  --            * I Have Reviewed All Lab Data Listed Above  * Additional Pertinent Lab Tests Reviewed: Latrice 66 Admission Reviewed    Imaging:    Imaging Reports Reviewed Today Include: images reviewed    Recent Cultures (last 7 days):     Results from last 7 days   Lab Units 02/02/20 2122 02/02/20 1924 02/02/20 1916   BLOOD CULTURE   --  No Growth at 72 hrs  No Growth at 72 hrs  LEGIONELLA URINARY ANTIGEN  Negative  --   --        Last 24 Hours Medication List:     Current Facility-Administered Medications:  acetaminophen 650 mg Oral Q6H PRN Sherron Hart MD   aspirin 81 mg Oral Daily Sherron Hart MD   atorvastatin 40 mg Oral QPM Sherron Hart MD   enoxaparin 40 mg Subcutaneous Daily Sherron Hart MD   lisinopril 2 5 mg Oral Daily Sherron Hart MD   metoprolol 2 5 mg Intravenous Q6H Sherron Hart MD   ondansetron 4 mg Intravenous Q6H PRN Sherron Hart MD   pantoprazole 40 mg Oral Daily Sherron Hart MD        Today, Patient Was Seen By: Sherron Hart MD    ** Please Note: Dictation voice to text software may have been used in the creation of this document   **

## 2020-02-06 NOTE — UTILIZATION REVIEW
Continued Stay Review    Date: 2/6/20                       Current Patient Class:INpatient  Current Level of Care: Med/surg    HPI:79 y o  female initially admitted on 2/2/20  Assessment/Plan:   Strong suspicion for cardioembolic stroke  VBS determines regular diet with thin liquids  BP still elevated  Lisinopril started  REferrals sent to STR  DC pending improved bp in the morning  2/5 Speech eval:  Check VBS  NPO until complete  OPERATIVE NOTE   SURGERY DATE: 2/5/2020  Procedure(s) (LRB):  TRANSESOPHAGEAL ECHOCARDIOGRAM (LUPE) (N/A)   LEFT VENTRICLE: Size was normal  Systolic function was normal  Ejection fraction was estimated in the range of 60 % to 65 %  There were no regional wall motion abnormalities  Wall thickness was at the upper limits of normal  No evidence of  apical thrombus  RIGHT VENTRICLE: The size was normal  Systolic function was normal  Wall thickness was normal   LEFT ATRIUM: The atrium was mildly dilated  APPENDAGE: The size was normal  No mass was identified  No thrombus was identified  ATRIAL SEPTUM: No defect or patent foramen ovale was identified  Contrast injection was performed  There was no right-to-left shunt, with provocative maneuvers to increase right atrial pressure  RIGHT ATRIUM: Size was normal   MITRAL VALVE: Valve structure was normal  There was normal leaflet separation  DOPPLER: The transmitral velocity was within the normal range  There was no evidence for stenosis  There was mild regurgitation  AORTIC VALVE: The valve was trileaflet  Leaflets exhibited normal thickness and normal cuspal separation  DOPPLER: Transaortic velocity was within the normal range  There was no evidence for stenosis  There was no significant  regurgitation  TRICUSPID VALVE: The valve structure was normal  There was normal leaflet separation  DOPPLER: There was no significant regurgitation    PULMONIC VALVE: Leaflets exhibited normal thickness, no calcification, and normal cuspal separation  DOPPLER: The transpulmonic velocity was within the normal range  There was no significant regurgitation  PERICARDIUM: There was no pericardial effusion  AORTA: The root exhibited normal size  Moderate layered atherosclerotic plaque seen in the aorta without mobile component  SYSTEMIC VEINS: IVC: Not assessed  PULMONARY VEINS: The pulmonary veins were normal sized  DOPPLER: Doppler flow pattern was normal in the pulmonary vein(s)      UPdate 2/4:  Developed worsening expressive aphagia overnight possibly related to acute blood pressure drip  DC IV hydralazine  NPO until repeat speech eval   Follows commands but is nonverbal   Discontinue abx    2/4 Neuro consult: MRI from yesterday showed scattered infarcts in left MCA and PCA territories  Check CTA  Worsened aphagia may be due to worsening prior infarcts or an evolution of new ones  REpeat MRI    2/4 UPDATE AFTER MRI: Repeat MRI showed interval extension of the nonhemorrhagic left BUZZ territory infarction involving the parasagittal left posterior frontal lobe no extending to the cingulate gyrus  As per neuro consult, no iv heparin at this time  COntinue aspirin, check LUPE, loop recorder  Pertinent Labs/Diagnostic Results:   2/4 MRI brain: Interval extension of the nonhemorrhagic left BUZZ territory infarction involving the parasagittal left posterior frontal lobe now extending to the cingulate gyrus  Stable PCA territory parasagittal parietal infarctions  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  2/4 CXR:  No acute cardiopulmonary disease, improved  2/4 CTA head: No significant carotid or vertebral artery stenosis   No focal intracranial stenosis or aneurysm  Complete Knik of Renee  Evolving infarcts paramedian left frontoparietal junction    Results from last 7 days   Lab Units 02/04/20  0457 02/03/20  0430 02/02/20  1916   WBC Thousand/uL 9 48 10 07 15 08*   HEMOGLOBIN g/dL 14 2 12 9 14 7   HEMATOCRIT % 43 7 41 2 46 2*   PLATELETS Thousands/uL 238 238 277   NEUTROS ABS Thousands/µL 6 96 7 51 12 40*         Results from last 7 days   Lab Units 02/04/20  0457 02/03/20  0430 02/02/20  1916   SODIUM mmol/L 139 140 140   POTASSIUM mmol/L 4 1 3 7 3 7   CHLORIDE mmol/L 104 104 101   CO2 mmol/L 29 27 28   ANION GAP mmol/L 6 9 11   BUN mg/dL 13 14 19   CREATININE mg/dL 0 70 0 62 0 81   EGFR ml/min/1 73sq m 84 87 70   CALCIUM mg/dL 8 6 8 0* 9 0   MAGNESIUM mg/dL 2 0  --   --    PHOSPHORUS mg/dL 2 6  --   --      Results from last 7 days   Lab Units 02/04/20  0457 02/02/20  1916   AST U/L 30 21   ALT U/L 21 21   ALK PHOS U/L 85 96   TOTAL PROTEIN g/dL 7 0 7 7   ALBUMIN g/dL 3 2* 3 6   TOTAL BILIRUBIN mg/dL 0 69 0 83     Results from last 7 days   Lab Units 02/02/20  1909   POC GLUCOSE mg/dl 136     Results from last 7 days   Lab Units 02/04/20  0457 02/03/20  0430 02/02/20  1916   GLUCOSE RANDOM mg/dL 191* 148* 142*         Results from last 7 days   Lab Units 02/04/20  0457   HEMOGLOBIN A1C % 7 8*   EAG mg/dl 177       Results from last 7 days   Lab Units 02/02/20  1916   TROPONIN I ng/mL <0 02     Results from last 7 days   Lab Units 02/04/20  0959 02/03/20  0430 02/03/20  0228   PROCALCITONIN ng/ml <0 05 0 05 <0 05     Results from last 7 days   Lab Units 02/02/20  1919   LACTIC ACID mmol/L 1 6             Results from last 7 days   Lab Units 02/02/20  1916   NT-PRO BNP pg/mL 298     Results from last 7 days   Lab Units 02/02/20  2122   CLARITY UA  Clear   COLOR UA  Yellow   SPEC GRAV UA  1 025   PH UA  6 0   GLUCOSE UA mg/dl Negative   KETONES UA mg/dl 40 (2+)*   BLOOD UA  Trace-lysed*   PROTEIN UA mg/dl Trace*   NITRITE UA  Negative   BILIRUBIN UA  Small*   UROBILINOGEN UA E U /dl 0 2   LEUKOCYTES UA  Negative   WBC UA /hpf None Seen   RBC UA /hpf 0-1*   BACTERIA UA /hpf Occasional   EPITHELIAL CELLS WET PREP /hpf Occasional     Results from last 7 days   Lab Units 02/02/20 2122 02/02/20  1920   STREP PNEUMONIAE ANTIGEN, URINE  Negative  --    LEGIONELLA URINARY ANTIGEN  Negative  --    INFLUENZA A PCR   --  None Detected   INFLUENZA B PCR   --  None Detected   RSV PCR   --  None Detected     Results from last 7 days   Lab Units 02/02/20  1924 02/02/20  1916   BLOOD CULTURE  No Growth at 72 hrs  No Growth at 72 hrs       Vital Signs:   /Time  Temp  Pulse  Resp  BP  MAP (mmHg)  SpO2  O2 Device  Cardiac (WDL)  Patient Position - Orthostatic VS   02/06/20 1143  98 °F (36 7 °C)  84  20  189/91Abnormal   130  95 %  None (Room air)    Sitting   02/06/20 1000    85  24Abnormal   168/76    94 %      Sitting   02/06/20 0900      20               02/06/20 0729  97 9 °F (36 6 °C)  79  21  193/107Abnormal   143  93 %  None (Room air)    Lying   02/06/20 0547        184/81Abnormal              02/06/20 0315    82  20  196/86Abnormal     94 %  None (Room air)    Lying   02/05/20 2308    78  16  181/78Abnormal     94 %  None (Room air)    Lying   02/05/20 1939  97 7 °F (36 5 °C)  93  16  189/84Abnormal     94 %  None (Room air)    Lying   02/05/20 1719    80    178/84Abnormal   121           02/05/20 1504  98 8 °F (37 1 °C)  84  23Abnormal   189/90Abnormal   129  96 %  None (Room air)    Lying   02/05/20 1445  98 4 °F (36 9 °C)  87  24Abnormal   209/86Abnormal     96 %  None (Room air)  WDL     02/05/20 1430    80  22  174/77Abnormal     94 %  None (Room air)  WDL     02/05/20 1429  97 3 °F (36 3 °C)Abnormal   76  24Abnormal   174/77Abnormal     96 %  None (Room air)  WDL     02/05/20 1342  97 4 °F (36 3 °C)Abnormal   87  20  216/93Abnormal     96 %  None (Room air)       02/05/20 1300    84  26Abnormal   128/101Abnormal                    Medications:   Scheduled Medications:    Medications:  aspirin 81 mg Oral Daily   atorvastatin 40 mg Oral QPM   enoxaparin 40 mg Subcutaneous Daily   lisinopril 2 5 mg Oral Daily   metoprolol 2 5 mg Intravenous Q6H   pantoprazole 40 mg Oral Daily     Continuous IV Infusions:     PRN Meds:    acetaminophen 650 mg Oral Q6H PRN   ondansetron 4 mg Intravenous Q6H PRN       Discharge Plan: TBD  Network Utilization Review Department  [email or web address]  ATTENTION: Please call with any questions or concerns to [phone] and carefully listen to the prompts so that you are directed to the right person  All voicemails are confidential   Bernarda Guthrie all requests for admission clinical reviews, approved or denied determinations and any other requests to dedicated fax number below belonging to the campus where the patient is receiving treatment   List of dedicated fax numbers for the Facilities:  [address] DENIALS (Administrative/Medical Necessity) [phone]   [address] (Maternity/NICU/Pediatrics) [phone]   Felicitas Courser [phone]   David Wilks [phone]   Dwayne Grit [phone]   [address] [facility] [address]  [phone]   [address] [facility] [address] [place] [address] [phone]   [facility]  [phone]   [address] [facility] [address]  [address] [facility] [address] [facility] [phone]

## 2020-02-06 NOTE — QUICK NOTE
Repeat MRI revealed extension of the infarct in left BUZZ territory, while it was unchanged in PCA territory  There does not appear to be new embolic strokes, but rather just extension of the prior stroke, probably related to the hypotension event  LUPE showed no clots but dilatation of left atrium  Stroke remains cryptogenic, but with high likelihood for cardioembolic source  She will undergo loop recorder placement in outpatient and follow in neurology clinic  Continue aspirin and statin in the meanwhile

## 2020-02-06 NOTE — SOCIAL WORK
CM received call from Morley at [facility]  Poncho stated he is covering for [employer] in admissions and does not have access to the [facility] referral for pt  Poncho asked that CM fax referral to [phone] so he could review  1000--CM faxed H&P, PT/OT consults, Speech consults, and all additional consults for review  Attending Dr Taran Jefferson noted during morning CM rounds that they are monitoring pt BP today as it has been unstable  Pt anticipated pt to be ready for d/c later today vs tomorrow pending acceptance to rehab and insurance auth

## 2020-02-06 NOTE — PROGRESS NOTES
Pt now verbal, speaking with RD  Reports eating 1 meal per day at home that is either from a restaurant or cheese with toast made at home  Provided diet ed for heart healthy food items and increasing intake to stimulate appetite/meet energy needs  Pt is currently eating 75% of meals  Will order ensure compact daily to encourage continued increased intake  Given adequate intake at this time and CVA/blood pressure status, will maintain sodium restriction

## 2020-02-06 NOTE — PHYSICAL THERAPY NOTE
Physical Therapy Screen    Patient Name: Bettyjane Osgood    ESGJH'O Date: 2/6/2020     Problem List  Principal Problem:    Acute ischemic cerebrovascular accident (CVA) involving left middle cerebral artery territory [facility])  Active Problems:    Essential hypertension    Fall    Gastroesophageal reflux disease without esophagitis    Generalized weakness    Respiratory distress    Acute CVA (cerebrovascular accident) [facility])       Past Medical History  Past Medical History:   Diagnosis Date    CVA (cerebral vascular accident) ([address] ) 02/2020    GERD (gastroesophageal reflux disease)     Hypertension         Past Surgical History  Past Surgical History:   Procedure Laterality Date    CHOLECYSTECTOMY          02/06/20 1038   Pain Assessment   Pain Assessment No/denies pain   Pain Score No Pain   Restrictions/Precautions   Weight Bearing Precautions Per Order No   Other Precautions Chair Alarm; Bed Alarm;Multiple lines; Fall Risk;Pain  (R hemiparesis- risk for skin breakdown/ contracture )   General   Chart Reviewed Yes   Response to Previous Treatment Patient unable to report, no changes reported from family or staff   Family/Caregiver Present No   Cognition   Arousal/Participation Alert; Responsive; Cooperative   Attention Within functional limits   Orientation Level Oriented X4   Memory Within functional limits   Following Commands Follows one step commands with increased time or repetition   Subjective   Subjective pt awake and motivated for PT - agreable and requesting OOB to recliner    Bed Mobility   Supine to Sit 2  Maximal assistance   Additional items Assist x 1; Increased time required;Verbal cues  (A w/ L hemiibody )   Additional Comments worked on maintaining midline/ trunk control at EOB - iniitally requring max A x1 for uprighgt posture able to initiate and maintain midline w/ V/C and T/C - (will benefit additionally from visual imput- tolerates ~12 mins (+) incontinent of urine and requring maxA x1 sit pivot to R drop arm chair and max A x1-2 for repositioning-  pt noted to track visually across midline w/o cues or prompting- fatigues easily  Transfers   Sit to Stand 2  Maximal assistance   Additional items Assist x 1; Increased time required;Verbal cues  (R knee blocked  weight shift in stand w/ knee blocked )   Stand to Sit 2  Maximal assistance   Additional items Assist x 1;Assist x 2  (A of second required for safety and lines )   Stand pivot 2  Maximal assistance   Additional items Assist x 1; Increased time required;Verbal cues   Additional Comments sit<> stands from EOB w/ R knee blocked for initiation of weight shifting onto R LE- pt unable to progress to advancement and fatigues easily- max A for SPV to R to drop arm chair  Ambulation/Elevation   Gait pattern   (unable/ not appropriate )   Balance   Static Sitting Poor   Dynamic Sitting Poor -   Static Standing Poor -   Endurance Deficit   Endurance Deficit Yes   Activity Tolerance   Activity Tolerance Patient tolerated treatment well;Patient limited by fatigue   Medical Staff Made Aware OT RN    Nurse Made Aware yes- cleared for session    Exercises   Neuro re-ed seated trunk control righting reactions EOB- reaching/ transking across midline  STS at EOB w/ manual weight shift over R LE for neuro input- R knee blocked (hyperext avoided) Spv xfer training initiated to R to drop arm chair  AAROM PROM R hemibody    Assessment   Prognosis Good   Problem List Decreased strength;Decreased range of motion;Decreased endurance; Impaired balance;Decreased mobility; Decreased coordination;Decreased cognition; Impaired judgement;Decreased safety awareness; Impaired sensation; Impaired tone;Obesity;Pain   Assessment pt progressing well w/ increased activity tolerance today- able to tolerate increased time at EOB- participated and is motivated throughout   weightshifting to hemiparetic side in seated as well as stadning positions w/ UE shouler supported and knee blocked  pt unable to advance R LE for functional steps- however w/ some increased R LE tone and assisting w/ upright possture w/ manual weight shift and facilitation- pt will cont to benefit from extensive comprehensive rehab program to maximize recovery s/p CVA   will cont to follw and progress  Goals   STG Expiration Date 02/15/20   PT Treatment Day 2   Plan   Treatment/Interventions Functional transfer training;LE strengthening/ROM; Therapeutic exercise; Endurance training;Cognitive reorientation;Patient/family training;Equipment eval/education; Bed mobility; Compensatory technique education;Spoke to nursing;Spoke to case management;OT   Progress Progressing toward goals   PT Frequency   (3-6x/wk )   Recommendation   Recommendation Post acute IP rehab   Equipment Recommended   (TBD in rehab )   PT - OK to Discharge Yes  (to rehab )   Additional Comments chair alarm activated post session         Hali Neal, PT

## 2020-02-06 NOTE — ASSESSMENT & PLAN NOTE
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  Repeat MRI done for worsening aphasia and showed Interval extension of the nonhemorrhagic left BUZZ territory infarction involving the parasagittal left posterior frontal lobe now extending to the cingulate gyrus  Discussed with neurology and suggested to continue with aspirin and statin for now  Telemetry showed no significant event  TTE/LUPE showed no significant clot or PFO  Strong suspicion for cardioembolic stroke  Patient will require loop recorder  Loop recurred will be arranged by cardiology clinic as an outpatient  VF suggested regular diet with thin liquids  PTOT appreciated  Patient is medically stable for discharge

## 2020-02-06 NOTE — ASSESSMENT & PLAN NOTE
More than 48 hours post acute stroke  BP still elevated  Lisinopril 2 5 mg started today  Keep systolic blood pressure between 160 and 180 and slowly manage blood pressure as an outpatient with a goal of BP less than 130/80

## 2020-02-07 ENCOUNTER — TELEPHONE (OUTPATIENT)
Dept: CARDIOLOGY CLINIC | Facility: CLINIC | Age: 80
End: 2020-02-07

## 2020-02-07 DIAGNOSIS — I63.9 ACUTE CVA (CEREBROVASCULAR ACCIDENT) (HCC): Primary | ICD-10-CM

## 2020-02-07 PROCEDURE — 99233 SBSQ HOSP IP/OBS HIGH 50: CPT | Performed by: INTERNAL MEDICINE

## 2020-02-07 PROCEDURE — 97530 THERAPEUTIC ACTIVITIES: CPT

## 2020-02-07 PROCEDURE — 97112 NEUROMUSCULAR REEDUCATION: CPT

## 2020-02-07 RX ORDER — LABETALOL 20 MG/4 ML (5 MG/ML) INTRAVENOUS SYRINGE
10 ONCE
Status: COMPLETED | OUTPATIENT
Start: 2020-02-07 | End: 2020-02-07

## 2020-02-07 RX ORDER — LISINOPRIL 2.5 MG/1
2.5 TABLET ORAL ONCE
Status: COMPLETED | OUTPATIENT
Start: 2020-02-07 | End: 2020-02-07

## 2020-02-07 RX ORDER — LABETALOL 100 MG/1
100 TABLET, FILM COATED ORAL EVERY 12 HOURS SCHEDULED
Status: DISCONTINUED | OUTPATIENT
Start: 2020-02-07 | End: 2020-02-08 | Stop reason: HOSPADM

## 2020-02-07 RX ORDER — LISINOPRIL 5 MG/1
5 TABLET ORAL DAILY
Status: DISCONTINUED | OUTPATIENT
Start: 2020-02-08 | End: 2020-02-08

## 2020-02-07 RX ADMIN — LISINOPRIL 2.5 MG: 2.5 TABLET ORAL at 14:08

## 2020-02-07 RX ADMIN — LABETALOL HYDROCHLORIDE 100 MG: 100 TABLET, FILM COATED ORAL at 21:54

## 2020-02-07 RX ADMIN — LISINOPRIL 2.5 MG: 2.5 TABLET ORAL at 09:42

## 2020-02-07 RX ADMIN — METOPROLOL TARTRATE 2.5 MG: 5 INJECTION, SOLUTION INTRAVENOUS at 03:44

## 2020-02-07 RX ADMIN — PANTOPRAZOLE SODIUM 40 MG: 40 TABLET, DELAYED RELEASE ORAL at 09:42

## 2020-02-07 RX ADMIN — ASPIRIN 81 MG 81 MG: 81 TABLET ORAL at 09:43

## 2020-02-07 RX ADMIN — ENOXAPARIN SODIUM 40 MG: 40 INJECTION SUBCUTANEOUS at 09:43

## 2020-02-07 RX ADMIN — METOPROLOL TARTRATE 2.5 MG: 5 INJECTION, SOLUTION INTRAVENOUS at 09:26

## 2020-02-07 RX ADMIN — ATORVASTATIN CALCIUM 40 MG: 40 TABLET, FILM COATED ORAL at 18:34

## 2020-02-07 RX ADMIN — METOPROLOL TARTRATE 2.5 MG: 5 INJECTION, SOLUTION INTRAVENOUS at 15:23

## 2020-02-07 RX ADMIN — LABETALOL HYDROCHLORIDE 100 MG: 100 TABLET, FILM COATED ORAL at 15:23

## 2020-02-07 RX ADMIN — LABETALOL 20 MG/4 ML (5 MG/ML) INTRAVENOUS SYRINGE 10 MG: at 10:00

## 2020-02-07 NOTE — PLAN OF CARE
Problem: PHYSICAL THERAPY ADULT  Goal: Performs mobility at highest level of function for planned discharge setting  See evaluation for individualized goals  Description  Treatment/Interventions: Functional transfer training, LE strengthening/ROM, Elevations, Therapeutic exercise, Endurance training, Patient/family training, Equipment eval/education, Bed mobility, Gait training, Compensatory technique education, Spoke to nursing, OT, Spoke to case management  Equipment Recommended: (TBD)       See flowsheet documentation for full assessment, interventions and recommendations  Outcome: Progressing  Note:   Prognosis: Good  Problem List: Decreased strength, Decreased range of motion, Decreased endurance, Impaired balance, Decreased mobility, Decreased coordination, Impaired judgement, Decreased safety awareness, Impaired sensation, Impaired tone  Assessment: Pt tolerated session fairly  She was able to ant/lat wt shift to left to facilitate improved sitting balance at EOB with mod/maximal manual and verbal cues  She requires manual management of R UE and LE to facilitate sitting posture, balance and input on right  She continues to require increased assistance with bed mobility and transfers associated with flaccid R UE and LE and hypotonia noted R UE and LE  She requires R LE to be blocked to prevent buckling and hyperextension and increased assistance to wt shift right and left  She is unable to elevate L LE despite max verbal and manual cues for technique and wt shifting  She is limited by decreased strength, balance, endurance, tone, coordination  She is motivated to participate and improve mobility  She will continue to benefit from PT services increase mobility as able  Trial sit<>stand with HW versus facing bed with L UE support to facilitate wt shifting for progression to spt and ambulation as appropriate           Recommendation: Post acute IP rehab     PT - OK to Discharge: Yes(when medically cleared for rehab)    See flowsheet documentation for full assessment

## 2020-02-07 NOTE — ASSESSMENT & PLAN NOTE
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  Repeat MRI done for worsening aphasia and showed Interval extension of the nonhemorrhagic left BUZZ territory infarction involving the parasagittal left posterior frontal lobe now extending to the cingulate gyrus  Discussed with neurology and suggested to continue with aspirin and statin for now  Telemetry showed no significant event  TTE/LUPE showed no significant clot or PFO  Strong suspicion for cardioembolic stroke  Patient will require loop recorder  Loop recurred will be arranged by cardiology clinic as an outpatient  VF suggested regular diet with thin liquids  PTOT appreciated

## 2020-02-07 NOTE — PROGRESS NOTES
Progress Note - Rosie Quiroz 1940, 70 y o  female MRN: [id number]    Unit/Bed#: -01 Encounter: [id number]    Primary Care Provider: Usha Lundy DO   Date and time admitted to hospital: 2/2/2020  6:44 PM    * Acute ischemic cerebrovascular accident (CVA) involving left middle cerebral artery territory [facility])  Assessment & Plan  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  Repeat MRI done for worsening aphasia and showed Interval extension of the nonhemorrhagic left BUZZ territory infarction involving the parasagittal left posterior frontal lobe now extending to the cingulate gyrus  Discussed with neurology and suggested to continue with aspirin and statin for now  Telemetry showed no significant event  TTE/LUPE showed no significant clot or PFO  Strong suspicion for cardioembolic stroke  Patient will require loop recorder  Loop recurred will be arranged by cardiology clinic as an outpatient  VF suggested regular diet with thin liquids  PTOT appreciated  Essential hypertension  Assessment & Plan  More than 48 hours post acute stroke  BP still elevated  Lisinopril 2 5 mg started today  Antihypertensive medications started  Slowly lower blood pressure not more than 5-10 mm of mercury  Continue lisinopril 5 mg daily and labetalol 100 mg twice daily  Keep systolic blood pressure between 160 and 180 and slowly manage blood pressure as an outpatient with a goal of BP less than 130/80        VTE Pharmacologic Prophylaxis:   Pharmacologic: Enoxaparin (Lovenox)    Patient Centered Rounds: I have performed bedside rounds with nursing staff today    Discussions with Specialists or Other Care Team Provider:     Education and Discussions with Family / Patient:     Current Length of Stay: 5 day(s)    Current Patient Status: Inpatient   Certification Statement: The patient will continue to require additional inpatient hospital stay due to Acute CVA, awaiting placement    Discharge Plan:  BP slightly elevated, adjust systolic blood pressure between 160 and 180 and possibly discharge tomorrow  Code Status: Level 1 - Full Code      Subjective:   No complaints  No new events  Objective:     Vitals:   Temp (24hrs), Av 2 °F (29 6 °C), Min:32 °F (0 °C), Max:98 7 °F (37 1 °C)    Temp:  [32 °F (0 °C)-98 7 °F (37 1 °C)] 98 6 °F (37 °C)  HR:  [61-86] 82  Resp:  [19-33] 20  BP: (164-242)/() 193/89  SpO2:  [94 %-97 %] 97 %  Body mass index is 23 65 kg/m²  Input and Output Summary (last 24 hours): Intake/Output Summary (Last 24 hours) at 2020 1456  Last data filed at 2020 1101  Gross per 24 hour   Intake 240 ml   Output 1400 ml   Net -1160 ml       Physical Exam:     General appearance: alert, appears stated age and cooperative  Head: Normocephalic, without obvious abnormality, atraumatic  Lungs: clear to auscultation bilaterally  Heart: regular rate and rhythm  Abdomen: soft, non-tender, positive bowel sounds   Back: negative  Extremities: extremities atraumatic, no cyanosis or edema  Neurologic: Motor: Motor deficit unchanged, expressive aphasia mildly improving      Additional Data:     Labs:    Results from last 7 days   Lab Units 20  0457   WBC Thousand/uL 9 48   HEMOGLOBIN g/dL 14 2   HEMATOCRIT % 43 7   PLATELETS Thousands/uL 238   NEUTROS PCT % 74   LYMPHS PCT % 15   MONOS PCT % 10   EOS PCT % 1     Results from last 7 days   Lab Units 20  0457   SODIUM mmol/L 139   POTASSIUM mmol/L 4 1   CHLORIDE mmol/L 104   CO2 mmol/L 29   BUN mg/dL 13   CREATININE mg/dL 0 70   ANION GAP mmol/L 6   CALCIUM mg/dL 8 6   ALBUMIN g/dL 3 2*   TOTAL BILIRUBIN mg/dL 0 69   ALK PHOS U/L 85   ALT U/L 21   AST U/L 30   GLUCOSE RANDOM mg/dL 191*         Results from last 7 days   Lab Units 20  1909   POC GLUCOSE mg/dl 136     Results from last 7 days   Lab Units 20  0457   HEMOGLOBIN A1C % 7 8*     Results from last 7 days   Lab Units 02/04/20  0959 02/03/20  0430 02/03/20  0228 02/02/20 1919   LACTIC ACID mmol/L  --   --   --  1 6   PROCALCITONIN ng/ml <0 05 0 05 <0 05  --            * I Have Reviewed All Lab Data Listed Above  * Additional Pertinent Lab Tests Reviewed: Lat[facility] 66 Admission Reviewed    Imaging:    Imaging Reports Reviewed Today Include: images reviewed    Recent Cultures (last 7 days):     Results from last 7 days   Lab Units 02/02/20 2122 02/02/20 1924 02/02/20 1916   BLOOD CULTURE   --  No Growth After 4 Days  No Growth After 4 Days  LEGIONELLA URINARY ANTIGEN  Negative  --   --        Last 24 Hours Medication List:     Current Facility-Administered Medications:  acetaminophen 650 mg Oral Q6H PRN Lu Gaona MD   aspirin 81 mg Oral Daily Lu Gaona MD   atorvastatin 40 mg Oral QPM Lu Gaona MD   enoxaparin 40 mg Subcutaneous Daily Lu Gaona MD   labetalol 100 mg Oral Q12H [address] Lu Gaona MD   [START ON 2/8/2020] lisinopril 5 mg Oral Daily Lu Gaona MD   metoprolol 2 5 mg Intravenous Q6H Lu Gaona MD   ondansetron 4 mg Intravenous Q6H PRN Lu Gaona MD   pantoprazole 40 mg Oral Daily Lu Gaona MD        Today, Patient Was Seen By: Lu Gaona MD    ** Please Note: Dictation voice to text software may have been used in the creation of this document   **

## 2020-02-07 NOTE — PLAN OF CARE
Problem: Prexisting or High Potential for Compromised Skin Integrity  Goal: Skin integrity is maintained or improved  Description  INTERVENTIONS:  - Identify patients at risk for skin breakdown  - Assess and monitor skin integrity  - Assess and monitor nutrition and hydration status  - Monitor labs   - Assess for incontinence   - Turn and reposition patient  - Assist with mobility/ambulation  - Relieve pressure over bony prominences  - Avoid friction and shearing  - Provide appropriate hygiene as needed including keeping skin clean and dry  - Evaluate need for skin moisturizer/barrier cream  - Collaborate with interdisciplinary team   - Patient/family teaching  - Consider wound care consult   Outcome: Progressing     Problem: Potential for Falls  Goal: Patient will remain free of falls  Description  INTERVENTIONS:  - Assess patient frequently for physical needs  -  Identify cognitive and physical deficits and behaviors that affect risk of falls  -  [place] fall precautions as indicated by assessment   - Educate patient/family on patient safety including physical limitations  - Instruct patient to call for assistance with activity based on assessment  - Modify environment to reduce risk of injury  - Consider OT/PT consult to assist with strengthening/mobility  Outcome: Progressing     Problem: Nutrition/Hydration-ADULT  Goal: Nutrient/Hydration intake appropriate for improving, restoring or maintaining nutritional needs  Description  Monitor and assess patient's nutrition/hydration status for malnutrition  Collaborate with interdisciplinary team and initiate plan and interventions as ordered  Monitor patient's weight and dietary intake as ordered or per policy  Utilize nutrition screening tool and intervene as necessary  Determine patient's food preferences and provide high-protein, high-caloric foods as appropriate       INTERVENTIONS:  - Monitor oral intake, urinary output, labs, and treatment plans  - Assess nutrition and hydration status and recommend course of action  - Evaluate amount of meals eaten  - Assist patient with eating if necessary   - Allow adequate time for meals  - Recommend/ encourage appropriate diets, oral nutritional supplements, and vitamin/mineral supplements  - Order, calculate, and assess calorie counts as needed  - Recommend, monitor, and adjust tube feedings and TPN/PPN based on assessed needs  - Assess need for intravenous fluids  - Provide specific nutrition/hydration education as appropriate  - Include patient/family/caregiver in decisions related to nutrition  Outcome: Progressing     Problem: PAIN - ADULT  Goal: Verbalizes/displays adequate comfort level or baseline comfort level  Description  Interventions:  - Encourage patient to monitor pain and request assistance  - Assess pain using appropriate pain scale  - Administer analgesics based on type and severity of pain and evaluate response  - Implement non-pharmacological measures as appropriate and evaluate response  - Consider cultural and social influences on pain and pain management  - Notify physician/advanced practitioner if interventions unsuccessful or patient reports new pain  Outcome: Progressing     Problem: DISCHARGE PLANNING  Goal: Discharge to home or other facility with appropriate resources  Description  INTERVENTIONS:  - Identify barriers to discharge w/patient and caregiver  - Arrange for needed discharge resources and transportation as appropriate  - Identify discharge learning needs (meds, wound care, etc )  - Arrange for interpretive services to assist at discharge as needed  - Refer to Case Management Department for coordinating discharge planning if the patient needs post-hospital services based on physician/advanced practitioner order or complex needs related to functional status, cognitive ability, or social support system  Outcome: Progressing     Problem: Knowledge Deficit  Goal: Patient/family/caregiver demonstrates understanding of disease process, treatment plan, medications, and discharge instructions  Description  Complete learning assessment and assess knowledge base  Interventions:  - Provide teaching at level of understanding  - Provide teaching via preferred learning methods  Outcome: Progressing     Problem: NEUROSENSORY - ADULT  Goal: Achieves stable or improved neurological status  Description  INTERVENTIONS  - Monitor and report changes in neurological status  - Monitor vital signs such as temperature, blood pressure, glucose, and any other labs ordered   - Initiate measures to prevent increased intracranial pressure  - Monitor for seizure activity and implement precautions if appropriate      Outcome: Progressing  Goal: Remains free of injury related to seizures activity  Description  INTERVENTIONS  - Maintain airway, patient safety  and administer oxygen as ordered  - Monitor patient for seizure activity, document and report duration and description of seizure to physician/advanced practitioner  - If seizure occurs,  ensure patient safety during seizure  - Reorient patient post seizure  - Seizure pads on all 4 side rails  - Instruct patient/family to notify RN of any seizure activity including if an aura is experienced  - Instruct patient/family to call for assistance with activity based on nursing assessment  - Administer anti-seizure medications if ordered    Outcome: Progressing  Goal: Achieves maximal functionality and self care  Description  INTERVENTIONS  - Monitor swallowing and airway patency with patient fatigue and changes in neurological status  - Encourage and assist patient to increase activity and self care     - Encourage visually impaired, hearing impaired and aphasic patients to use assistive/communication devices  Outcome: Progressing     Problem: RESPIRATORY - ADULT  Goal: Achieves optimal ventilation and oxygenation  Description  INTERVENTIONS:  - Assess for changes in respiratory status  - Assess for changes in mentation and behavior  - Position to facilitate oxygenation and minimize respiratory effort  - Oxygen administered by appropriate delivery if ordered  - Initiate smoking cessation education as indicated  - Encourage broncho-pulmonary hygiene including cough, deep breathe, Incentive Spirometry  - Assess the need for suctioning and aspirate as needed  - Assess and instruct to report SOB or any respiratory difficulty  - Respiratory Therapy support as indicated  Outcome: Progressing     Problem: SKIN/TISSUE INTEGRITY - ADULT  Goal: Skin integrity remains intact  Description  INTERVENTIONS  - Identify patients at risk for skin breakdown  - Assess and monitor skin integrity  - Assess and monitor nutrition and hydration status  - Monitor labs (i e  albumin)  - Assess for incontinence   - Turn and reposition patient  - Assist with mobility/ambulation  - Relieve pressure over bony prominences  - Avoid friction and shearing  - Provide appropriate hygiene as needed including keeping skin clean and dry  - Evaluate need for skin moisturizer/barrier cream  - Collaborate with interdisciplinary team (i e  Nutrition, Rehabilitation, etc )   - Patient/family teaching  Outcome: Progressing  Goal: Incision(s), wounds(s) or drain site(s) healing without S/S of infection  Description  INTERVENTIONS  - Assess and document risk factors for skin impairment   - Assess and document dressing, incision, wound bed, drain sites and surrounding tissue  - Consider nutrition services referral as needed  - Oral mucous membranes remain intact  - Provide patient/ family education  Outcome: Progressing  Goal: Oral mucous membranes remain intact  Description  INTERVENTIONS  - Assess oral mucosa and hygiene practices  - Implement preventative oral hygiene regimen  - Implement oral medicated treatments as ordered  - Initiate Nutrition services referral as needed  Outcome: Progressing

## 2020-02-07 NOTE — TELEPHONE ENCOUNTER
Pt is scheduled on 03/04/20 for a MDT Loop implant with Dr Osei Bullard at [facility]  Pt son aware of instructions  Can I get preauth please

## 2020-02-07 NOTE — ASSESSMENT & PLAN NOTE
More than 48 hours post acute stroke  BP still elevated  Lisinopril 2 5 mg started today  Antihypertensive medications started  Slowly lower blood pressure not more than 5-10 mm of mercury  Continue lisinopril 5 mg daily and labetalol 100 mg twice daily  Keep systolic blood pressure between 160 and 180 and slowly manage blood pressure as an outpatient with a goal of BP less than 130/80

## 2020-02-07 NOTE — TRANSPORTATION MEDICAL NECESSITY
Section I - General Information    Name of Patient: Sussy Vargas                 : 1940    Medicare #: [id number]  Transport Date: 2020 (PCS is valid for round trips on this date and for all repetitive trips in the 60-day range as noted below )  Origin: [facility] INTENS[facility]ARE UNIT                                                         Destination: [facility] on the 7th floor*  Is the pt's stay covered under Medicare Part A (PPS/DRG)   []     Closest appropriate facility? If no, why is transport to more distant facility required? Yes  If hospice pt, is this transport related to pt's terminal illness? No       Section II - Medical Necessity Questionnaire  Ambulance transportation is medically necessary only if other means of transport are contraindicated or would be potentially harmful to the patient  To meet this requirement, the patient must either be "bed confined" or suffer from a condition such that transport by means other than ambulance is contraindicated by the patient's condition  The following questions must be answered by the medical professional signing below for this form to be valid:    1)  Describe the MEDICAL CONDITION (physical and/or mental) of this patient AT [address] [place] [address] that requires the patient to be transported in an ambulance and why transport by other means is contraindicated by the patient's condition:   Acute ischemic cerebrovascular accident (CVA) involving left middle cerebral artery territory    2) Is the patient "bed confined" as defined below? Yes  To be "be confined" the patient must satisfy all three of the following conditions: (1) unable to get up from bed without Assistance; AND (2) unable to ambulate; AND (3) unable to sit in a chair or wheelchair  3) Can this patient safely be transported by car or wheelchair van (i e , seated during transport without a medical attendant or monitoring)?    No    4) In addition to completing questions 1-3 above, please check any of the following conditions that apply:   *Note: supporting documentation for any boxes checked must be maintained in the patient's medical records  If hosp-hosp transfer, describe services needed at 2nd facility not available at 1st facility? Medical attendant required    Patient unable to sit without assistance with balance issues/ lateral lean  -   Section III - Signature of Physician or Healthcare Professional  I certify that the above information is true and correct based on my evaluation of this patient, and represent that the patient requires transport by ambulance and that other forms of transport are contraindicated  I understand that this information will be used by the Centers for Medicare and Medicaid Services (CMS) to support the determination of medical necessity for ambulance services, and I represent that I have personal knowledge of the patient's condition at time of transport  []  If this box is checked, I also certify that the patient is physically or mentally incapable of signing the ambulance service's claim and that the institution with which I am affiliated has furnished care, services, or assistance to the patient  My signature below is made on behalf of the patient pursuant to 42 CFR §424 36(b)(4)   In accordance with 42 CFR §424 37, the specific reason(s) that the patient is physically or mentally incapable of signing the claim form is as follows :NA    Signature of Physician* or Abdon Garcia RN _____________________________________________________________  Signature Date 02/07/20 (For scheduled repetitive transports, this form is not valid for transports performed more than 60 days after this date)    Printed Name & Credentials of Physician or Healthcare Professional (MD, DO, RN, etc )________________________________  *Form must be signed by patient's attending physician for scheduled, repetitive transports   For non-repetitive, unscheduled ambulance transports, if unable to obtain the signature of the attending physician, any of the following may sign (choose appropriate option below)  [] Physician Assistant []  Clinical Nurse Specialist []  Registered Nurse  []  Nurse Practitioner  [x] Discharge Planner

## 2020-02-07 NOTE — NURSING NOTE
Patient had Lopressor 2 5mg IVP at 2130  Her pressure has maintained goal of SBP greater or equal to 160mmhg, and less than 220mmhg

## 2020-02-07 NOTE — PHYSICAL THERAPY NOTE
PHYSICAL THERAPY TREATMENT NOTE  NAME:  Clarisa Solomon  DATE: 02/07/20    Length Of Stay: 5  Performed at least 2 patient identifiers during session: Name and ID bracelet    TREATMENT:      02/07/20 1527   Pain Assessment   Pain Assessment No/denies pain   Pain Score No Pain   Restrictions/Precautions   Braces or Orthoses   (rec sling R UE for unsupported activity with R UE)   Other Precautions Chair Alarm;Multiple lines;Telemetry; Fall Risk  (R UE flaccid, risk for subluxation)   General   Chart Reviewed Yes   Response to Previous Treatment Patient with no complaints from previous session  Family/Caregiver Present No   Cognition   Following Commands Follows one step commands with increased time or repetition   Subjective   Subjective "I was going to tell a joke "   Bed Mobility   Supine to Sit 2  Maximal assistance   Additional items Assist x 1; Increased time required;LE management;Verbal cues  (trunk management, VCs for technique, use of L UE)   Additional Comments HOB elevated 45 degrees  requires maxAx1 to achieve sittign EOB with cues for technique to use L UE to facilitate trunk  static sitting at EOB with mod to maxAx1 with frequent cues for ant lateral wt shift to left due to right post leateral lean  pt with minimal core contraction to facilitate sitting balance with tactile cues at abdominal musculature to engage and assist with ant wt shift  manual placement of R UE on bed with wt shifting right and left to approximate and facilitate feedback to R UE  trunk lateral flexion to WB on right and left elbow 5x to each side with tactile cues for R UE extension and left trunk musculature to return to upright position  w lateral flexion to left, tactile cues at right trunk to facilitate return to upright posture  with R UE unsupported, patient noted to have (+) significant subluxation  Rec sling/UE support with R UE not WB for transfer trials      Transfers   Sit to Stand 2  Maximal assistance   Additional items Assist x 1;Assist x 2; Increased time required;Verbal cues  (Ax2 from EOB; man/VCs for upright posture  B feet/knee block)   Stand to Sit 2  Maximal assistance   Additional items Assist x 1;Assist x 2; Increased time required;Verbal cues  (Vcs for inc hip flexion upon sitting  )   Stand pivot   (unable to advance LE to complete spt)   Sit pivot 2  Maximal assistance  (to patient's left)   Additional items Assist x 1; Increased time required;Verbal cues  (VCs for tech)   Additional Comments sit<>stand from EOB with B knees and feet blocked with maxAx2  static standing with maxAx1 and minAx1 with wt shifting right and left to facilitate increased WB to R LE and prepare for spt/ambulation  returned to sitting EOB  then completed squat pivot transfer to recliner to pt's left with maxAx1 with RN, Adine Labor present and standing by  sit<>stand with L UE on bedrail with maxAx1 and max verbal and manual cues for upright posture  RN with steayding assistance prn for static standing  wt shifitng right and left with attmept to wt shift to right with R knee blockedant/post to prevent buckling and hyperextension  pt able unable to elevate L LE despite manual cues for wt shifting to right  returned to sitting  completed sit<>stand again with maxAx1 with manual and verbal cues for posture  wt shifting right and left, then patient able to elevate L foot slightly with manual cues for wt shifitng to right  R knee blocked ant/post and manual cues for upright posture to prevent R lateral lean  pt OOB in recliner chair with chair alarm on, LEs elevated w R LE on pillow to offload heel and decrease hip IR, B UEs elevated with rolled washcloth in hand to prevent flexion contracture  R UE positioned without subulaxtion noted  Ambulation/Elevation   Distance see comments above regarding wt shifting for pre-gait activity to facilitate increased WB and feedback to R LE to facilitate L LE advancement     Balance   Static Sitting Poor  (mod/maxAx1 for static sitting w manual cues for R UE)   Dynamic Sitting Poor -   Static Standing Poor -   Dynamic Standing Poor -   Activity Tolerance   Activity Tolerance Patient limited by fatigue;Patient tolerated treatment well   Nurse Made Aware RN, Mariana Goyal present for session   Exercises   Hip Flexion Sitting;15 reps;PROM; Left   Knee AROM Long Arc Quad Sitting;15 reps;PROM; Bilateral  (PROM R LE, AROM L LE)   Ankle Pumps Sitting;15 reps;PROM;AROM; Bilateral  (PROM R LE, AROM L LE)   Neuro re-ed see bed mobility comments above   Assessment   Prognosis Good   Problem List Decreased strength;Decreased range of motion;Decreased endurance; Impaired balance;Decreased mobility; Decreased coordination; Impaired judgement;Decreased safety awareness; Impaired sensation; Impaired tone   Goals   PT Treatment Day 3   Plan   Treatment/Interventions Functional transfer training;LE strengthening/ROM; Therapeutic exercise; Endurance training;Patient/family training;Equipment eval/education; Bed mobility;Gait training; Compensatory technique education;Spoke to nursing;Spoke to case management   Progress Slow progress, decreased activity tolerance   PT Frequency 5x/wk  (and one time either sat or sun)   Recommendation   Recommendation Post acute IP rehab   Equipment Recommended   (TBD)   PT - OK to Discharge Yes  (when medically cleared for rehab)     Pt tolerated session fairly  She was able to ant/lat wt shift to left to facilitate improved sitting balance at EOB with mod/maximal manual and verbal cues  She requires manual management of R UE and LE to facilitate sitting posture, balance and input on right  She continues to require increased assistance with bed mobility and transfers associated with flaccid R UE and LE and hypotonia noted R UE and LE  She requires R LE to be blocked to prevent buckling and hyperextension and increased assistance to wt shift right and left   She is unable to elevate L LE despite max verbal and manual cues for technique and wt shifting  She is limited by decreased strength, balance, endurance, tone, coordination  She is motivated to participate and improve mobility  She will continue to benefit from PT services increase mobility as able  Trial sit<>stand with HW versus facing bed with L UE support to facilitate wt shifting for progression to spt and ambulation as appropriate       Ayush Hoff, PT,DPT

## 2020-02-07 NOTE — CASE MANAGEMENT
[place] back from Lyssa Mittal: At [facility] in [place], they can accommodate patient for rehab tomorrow  CM updated patient and son Rudy Buenrostro at the bedside  They are in agreement to this  And Rudy Porter will inform the rest of the family  CM submitted auth to [facility] for Acute Rehab  CM also submitted for BLS transport with [facility]  Auth received from [facility] from [facility] approved for Acute Rehab:  [id number] next review is needed Tuesday to Sara: Fax clinical to  21 [phone]  Auth for transport was received from Alexander Cantrell from [facility]:  Christina Trejo for BLS transport is [id number]  All information on authorization was provided to Renaldo Darling at [facility]    Per Joanne transportation can be set up for 2 PM as they anticipate bed will be available, post discharges  1600: CM called and updated Rudy Buenrostro of [place] plan for tomorrow

## 2020-02-08 VITALS
TEMPERATURE: 97.9 F | DIASTOLIC BLOOD PRESSURE: 73 MMHG | WEIGHT: 151 LBS | HEART RATE: 82 BPM | RESPIRATION RATE: 16 BRPM | SYSTOLIC BLOOD PRESSURE: 157 MMHG | OXYGEN SATURATION: 97 % | BODY MASS INDEX: 23.7 KG/M2 | HEIGHT: 67 IN

## 2020-02-08 LAB
BACTERIA BLD CULT: NORMAL
BACTERIA BLD CULT: NORMAL
PLATELET # BLD AUTO: 259 THOUSANDS/UL (ref 149–390)
PMV BLD AUTO: 11 FL (ref 8.9–12.7)

## 2020-02-08 PROCEDURE — 99239 HOSP IP/OBS DSCHRG MGMT >30: CPT | Performed by: INTERNAL MEDICINE

## 2020-02-08 PROCEDURE — 85049 AUTOMATED PLATELET COUNT: CPT | Performed by: INTERNAL MEDICINE

## 2020-02-08 RX ORDER — ASPIRIN 81 MG/1
81 TABLET, CHEWABLE ORAL DAILY
Refills: 0
Start: 2020-02-09

## 2020-02-08 RX ORDER — ACETAMINOPHEN 325 MG/1
650 TABLET ORAL EVERY 6 HOURS PRN
Qty: 30 TABLET | Refills: 0
Start: 2020-02-08

## 2020-02-08 RX ORDER — LISINOPRIL 2.5 MG/1
2.5 TABLET ORAL DAILY
Status: DISCONTINUED | OUTPATIENT
Start: 2020-02-08 | End: 2020-02-08 | Stop reason: HOSPADM

## 2020-02-08 RX ORDER — AMOXICILLIN 250 MG
1 CAPSULE ORAL
Refills: 0
Start: 2020-02-08

## 2020-02-08 RX ORDER — LISINOPRIL 2.5 MG/1
2.5 TABLET ORAL DAILY
Refills: 0
Start: 2020-02-09

## 2020-02-08 RX ORDER — ATORVASTATIN CALCIUM 40 MG/1
40 TABLET, FILM COATED ORAL EVERY EVENING
Refills: 0
Start: 2020-02-08

## 2020-02-08 RX ORDER — POLYETHYLENE GLYCOL 3350 17 G/17G
17 POWDER, FOR SOLUTION ORAL DAILY PRN
Qty: 14 EACH | Refills: 0
Start: 2020-02-08

## 2020-02-08 RX ORDER — AMOXICILLIN 250 MG
1 CAPSULE ORAL
Status: DISCONTINUED | OUTPATIENT
Start: 2020-02-08 | End: 2020-02-08 | Stop reason: HOSPADM

## 2020-02-08 RX ORDER — LABETALOL 100 MG/1
100 TABLET, FILM COATED ORAL EVERY 12 HOURS SCHEDULED
Refills: 0
Start: 2020-02-08

## 2020-02-08 RX ADMIN — ENOXAPARIN SODIUM 40 MG: 40 INJECTION SUBCUTANEOUS at 08:50

## 2020-02-08 RX ADMIN — LISINOPRIL 2.5 MG: 2.5 TABLET ORAL at 08:51

## 2020-02-08 RX ADMIN — ASPIRIN 81 MG 81 MG: 81 TABLET ORAL at 08:51

## 2020-02-08 RX ADMIN — PANTOPRAZOLE SODIUM 40 MG: 40 TABLET, DELAYED RELEASE ORAL at 08:51

## 2020-02-08 RX ADMIN — LABETALOL HYDROCHLORIDE 100 MG: 100 TABLET, FILM COATED ORAL at 08:50

## 2020-02-08 NOTE — NURSING NOTE
Patient discharged with no complications to [facility] rehab  Transported by [facility]  Belongings sent with patient  Family aware of discharge plan

## 2020-02-08 NOTE — PLAN OF CARE
Problem: Prexisting or High Potential for Compromised Skin Integrity  Goal: Skin integrity is maintained or improved  Description  INTERVENTIONS:  - Identify patients at risk for skin breakdown  - Assess and monitor skin integrity  - Assess and monitor nutrition and hydration status  - Monitor labs   - Assess for incontinence   - Turn and reposition patient  - Assist with mobility/ambulation  - Relieve pressure over bony prominences  - Avoid friction and shearing  - Provide appropriate hygiene as needed including keeping skin clean and dry  - Evaluate need for skin moisturizer/barrier cream  - Collaborate with interdisciplinary team   - Patient/family teaching  - Consider wound care consult   Outcome: Completed     Problem: Potential for Falls  Goal: Patient will remain free of falls  Description  INTERVENTIONS:  - Assess patient frequently for physical needs  -  Identify cognitive and physical deficits and behaviors that affect risk of falls  -  [place] fall precautions as indicated by assessment   - Educate patient/family on patient safety including physical limitations  - Instruct patient to call for assistance with activity based on assessment  - Modify environment to reduce risk of injury  - Consider OT/PT consult to assist with strengthening/mobility  Outcome: Completed     Problem: Nutrition/Hydration-ADULT  Goal: Nutrient/Hydration intake appropriate for improving, restoring or maintaining nutritional needs  Description  Monitor and assess patient's nutrition/hydration status for malnutrition  Collaborate with interdisciplinary team and initiate plan and interventions as ordered  Monitor patient's weight and dietary intake as ordered or per policy  Utilize nutrition screening tool and intervene as necessary  Determine patient's food preferences and provide high-protein, high-caloric foods as appropriate       INTERVENTIONS:  - Monitor oral intake, urinary output, labs, and treatment plans  - Assess nutrition and hydration status and recommend course of action  - Evaluate amount of meals eaten  - Assist patient with eating if necessary   - Allow adequate time for meals  - Recommend/ encourage appropriate diets, oral nutritional supplements, and vitamin/mineral supplements  - Order, calculate, and assess calorie counts as needed  - Recommend, monitor, and adjust tube feedings and TPN/PPN based on assessed needs  - Assess need for intravenous fluids  - Provide specific nutrition/hydration education as appropriate  - Include patient/family/caregiver in decisions related to nutrition  Outcome: Completed     Problem: PAIN - ADULT  Goal: Verbalizes/displays adequate comfort level or baseline comfort level  Description  Interventions:  - Encourage patient to monitor pain and request assistance  - Assess pain using appropriate pain scale  - Administer analgesics based on type and severity of pain and evaluate response  - Implement non-pharmacological measures as appropriate and evaluate response  - Consider cultural and social influences on pain and pain management  - Notify physician/advanced practitioner if interventions unsuccessful or patient reports new pain  Outcome: Completed     Problem: DISCHARGE PLANNING  Goal: Discharge to home or other facility with appropriate resources  Description  INTERVENTIONS:  - Identify barriers to discharge w/patient and caregiver  - Arrange for needed discharge resources and transportation as appropriate  - Identify discharge learning needs (meds, wound care, etc )  - Arrange for interpretive services to assist at discharge as needed  - Refer to Case Management Department for coordinating discharge planning if the patient needs post-hospital services based on physician/advanced practitioner order or complex needs related to functional status, cognitive ability, or social support system  Outcome: Completed     Problem: Knowledge Deficit  Goal: Patient/family/caregiver demonstrates understanding of disease process, treatment plan, medications, and discharge instructions  Description  Complete learning assessment and assess knowledge base  Interventions:  - Provide teaching at level of understanding  - Provide teaching via preferred learning methods  Outcome: Completed     Problem: NEUROSENSORY - ADULT  Goal: Achieves stable or improved neurological status  Description  INTERVENTIONS  - Monitor and report changes in neurological status  - Monitor vital signs such as temperature, blood pressure, glucose, and any other labs ordered   - Initiate measures to prevent increased intracranial pressure  - Monitor for seizure activity and implement precautions if appropriate      Outcome: Completed  Goal: Remains free of injury related to seizures activity  Description  INTERVENTIONS  - Maintain airway, patient safety  and administer oxygen as ordered  - Monitor patient for seizure activity, document and report duration and description of seizure to physician/advanced practitioner  - If seizure occurs,  ensure patient safety during seizure  - Reorient patient post seizure  - Seizure pads on all 4 side rails  - Instruct patient/family to notify RN of any seizure activity including if an aura is experienced  - Instruct patient/family to call for assistance with activity based on nursing assessment  - Administer anti-seizure medications if ordered    Outcome: Completed  Goal: Achieves maximal functionality and self care  Description  INTERVENTIONS  - Monitor swallowing and airway patency with patient fatigue and changes in neurological status  - Encourage and assist patient to increase activity and self care     - Encourage visually impaired, hearing impaired and aphasic patients to use assistive/communication devices  Outcome: Completed     Problem: RESPIRATORY - ADULT  Goal: Achieves optimal ventilation and oxygenation  Description  INTERVENTIONS:  - Assess for changes in respiratory status  - Assess for changes in mentation and behavior  - Position to facilitate oxygenation and minimize respiratory effort  - Oxygen administered by appropriate delivery if ordered  - Initiate smoking cessation education as indicated  - Encourage broncho-pulmonary hygiene including cough, deep breathe, Incentive Spirometry  - Assess the need for suctioning and aspirate as needed  - Assess and instruct to report SOB or any respiratory difficulty  - Respiratory Therapy support as indicated  Outcome: Completed     Problem: SKIN/TISSUE INTEGRITY - ADULT  Goal: Skin integrity remains intact  Description  INTERVENTIONS  - Identify patients at risk for skin breakdown  - Assess and monitor skin integrity  - Assess and monitor nutrition and hydration status  - Monitor labs (i e  albumin)  - Assess for incontinence   - Turn and reposition patient  - Assist with mobility/ambulation  - Relieve pressure over bony prominences  - Avoid friction and shearing  - Provide appropriate hygiene as needed including keeping skin clean and dry  - Evaluate need for skin moisturizer/barrier cream  - Collaborate with interdisciplinary team (i e  Nutrition, Rehabilitation, etc )   - Patient/family teaching  Outcome: Completed  Goal: Incision(s), wounds(s) or drain site(s) healing without S/S of infection  Description  INTERVENTIONS  - Assess and document risk factors for skin impairment   - Assess and document dressing, incision, wound bed, drain sites and surrounding tissue  - Consider nutrition services referral as needed  - Oral mucous membranes remain intact  - Provide patient/ family education  Outcome: Completed  Goal: Oral mucous membranes remain intact  Description  INTERVENTIONS  - Assess oral mucosa and hygiene practices  - Implement preventative oral hygiene regimen  - Implement oral medicated treatments as ordered  - Initiate Nutrition services referral as needed  Outcome: Completed

## 2020-02-08 NOTE — NURSING NOTE
Patient rested well throughout the night  No c/o pain  Neurologically no change in assessment since documented last evening

## 2020-02-08 NOTE — ASSESSMENT & PLAN NOTE
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  Repeat MRI done for worsening aphasia and showed Interval extension of the nonhemorrhagic left BUZZ territory infarction involving the parasagittal left posterior frontal lobe now extending to the cingulate gyrus  Discussed with neurology and suggested to continue with aspirin and statin for now  Telemetry showed no significant event  TTE/LUPE showed no significant clot or PFO  Strong suspicion for cardioembolic stroke  Patient will require loop recorder  Loop recurred will be arranged by cardiology clinic as an outpatient  VF suggested regular diet with thin liquids  Blood pressure adequately controlled  Medically stable for discharge

## 2020-02-08 NOTE — DISCHARGE SUMMARY
Discharge- Buck Clark 1940, 70 y o  female MRN: [id number]    Unit/Bed#: -01 Encounter: [id number]    Primary Care Provider: Herman Simpson DO   Date and time admitted to hospital: 2/2/2020  6:44 PM      * Acute ischemic cerebrovascular accident (CVA) involving left middle cerebral artery territory [facility])  Assessment & Plan  Acute CVA involving left middle and anterior cerebral artery territory  Repeat MRI done for worsening aphasia and showed Interval extension of the nonhemorrhagic left BUZZ territory infarction involving the parasagittal left posterior frontal lobe now extending to the cingulate gyrus  Discussed with neurology and suggested to continue with aspirin and statin for now  Telemetry showed no significant event throughout her stay  TTE/LUPE showed no significant thrombus or PFO  Strong suspicion for cardioembolic stroke  Patient will require loop recorder  Loop recurred will be arranged by cardiology clinic as an outpatient  VF suggested regular diet with thin liquids  Blood pressure adequately controlled  Medically stable for discharge  Fall  Assessment & Plan  · Multiple falls at home due to generalized weakness, acute CVA      PT OT, patient is going to likely require acute rehab  Essential hypertension  Assessment & Plan  More than 48 hours post acute stroke  BP still elevated  Lisinopril 2 5 mg started today  Antihypertensive medications started  Slowly lower blood pressure not more than 5-10 mm of mercury  Continue lisinopril 5 mg daily and labetalol 100 mg twice daily  Keep systolic blood pressure between 160 and 180 and slowly manage blood pressure as an outpatient with a goal of BP less than 130/80        Discharging Physician / Practitioner: Robert Chávez MD  PCP: Herman Simpson DO  Admission Date:   Admission Orders (From admission, onward)     Ordered        02/02/20 2222  Inpatient Admission  Once                   Discharge Date: 02/08/20    Disposition:      Acute Rehab Facility at [address] [place] [address] SNF:   · Not Applicable to this Patient - Not Applicable to this Patient    Reason for Admission:  Acute CVA    Discharge Diagnoses:     Please see assessment and plan section above for further details regarding discharge diagnoses  Resolved Problems  Date Reviewed: 2/5/2020          Resolved    Pneumonia due to infectious organism 2/5/2020     Resolved by  Radha Martini MD          Consultations During Hospital Stay:  IP CONSULT TO CASE MANAGEMENT  IP CONSULT TO NUTRITION SERVICES  IP CONSULT TO NEUROLOGY  IP CONSULT TO CARDIOLOGY     Procedures Performed:   Procedure(s) (LRB):  TRANSESOPHAGEAL ECHOCARDIOGRAM (LUPE) (N/A)      Cta Head And Neck W Wo Contrast    Result Date: 2/4/2020  Narrative: CTA NECK AND BRAIN WITH AND WITHOUT CONTRAST INDICATION: Neuro deficit, acute, stroke suspected COMPARISON:   None  TECHNIQUE:  Routine CT imaging of the Brain without contrast   Post contrast imaging was performed after administration of iodinated contrast through the neck and brain  Post contrast axial 0 625 mm images timed to opacify the arterial system  3D rendering was performed on an independent workstation  MIP reconstructions performed  Coronal reconstructions were performed of the noncontrast portion of the brain  Radiation dose length product (DLP) for this visit:  1182 2 mGy-cm   This examination, like all CT scans performed in the [facility], was performed utilizing techniques to minimize radiation dose exposure, including the use of iterative  reconstruction and automated exposure control  IV Contrast:  85 mL of iohexol (OMNIPAQUE)  IMAGE QUALITY:   Diagnostic FINDINGS: NONCONTRAST BRAIN PARENCHYMA:  Low-density paramedian left posterior frontal and anterior parietal lobe compatible with sites of recent infarct seen on MRI  Low-density periventricular regions compatible with chronic microangiopathic disease  No mass effect or midline shift  No hemorrhage identified  VENTRICLES AND EXTRA-AXIAL SPACES:  Normal for the patient's age  VISUALIZED ORBITS AND PARANASAL SINUSES:  Unremarkable  CERVICAL VASCULATURE AORTIC ARCH AND GREAT VESSELS:  Atheromatous plaque along the ascending aortic arch  RIGHT VERTEBRAL ARTERY CERVICAL SEGMENT:  Normal origin  There is atheromatous plaque resulting in moderate stenosis of the proximal vertebral artery seen best on series 5 image 405 just prior to entering the foramen transversarium at C7  The vessel is  normal in caliber throughout the neck  LEFT VERTEBRAL ARTERY CERVICAL SEGMENT:  Normal origin  The vessel is normal in caliber throughout the neck  RIGHT EXTRACRANIAL CAROTID SEGMENT:  Normal caliber common carotid artery  Normal bifurcation and cervical internal carotid artery  No stenosis or dissection  LEFT EXTRACRANIAL CAROTID SEGMENT:  Normal caliber common carotid artery  Normal bifurcation and cervical internal carotid artery  No stenosis or dissection  NASCET criteria was used to determine the degree of internal carotid artery diameter stenosis  INTRACRANIAL VASCULATURE INTERNAL CAROTID ARTERIES:  Normal enhancement of the intracranial portions of the internal carotid arteries  Normal ophthalmic artery origins  Normal ICA terminus  ANTERIOR CIRCULATION:  Symmetric A1 segments and anterior cerebral arteries with normal enhancement  Normal anterior communicating artery  MIDDLE CEREBRAL ARTERY CIRCULATION:  M1 segment and middle cerebral artery branches demonstrate normal enhancement bilaterally  DISTAL VERTEBRAL ARTERIES:  Normal distal vertebral arteries  Posterior inferior cerebellar artery origins are normal  Normal vertebral basilar junction  BASILAR ARTERY:  Basilar artery is normal in caliber  Normal superior cerebellar arteries  POSTERIOR CEREBRAL ARTERIES: Both posterior cerebral arteries arises from the basilar tip    Both arteries demonstrate normal enhancement  Normal posterior communicating arteries  DURAL VENOUS SINUSES:  Normal  NON VASCULAR ANATOMY BONY STRUCTURES:  No acute osseous abnormality  SOFT TISSUES OF THE NECK:  Normal  THORACIC INLET:  Unremarkable  Impression: No significant carotid or vertebral artery stenosis  No focal intracranial stenosis or aneurysm  Complete [place]  Evolving infarcts paramedian left frontoparietal junction  Workstation performed: [id number]     Xr Chest Portable    Result Date: 2/4/2020  Narrative: CHEST INDICATION:   hypoxia  COMPARISON:  2/2/2020 chest x-ray EXAM PERFORMED/VIEWS:  XR CHEST PORTABLE Single portable view FINDINGS: Cardiomediastinal silhouette appears unremarkable  The lungs are clear  Left basal subsegmental atelectasis has resolved  No pneumothorax or pleural effusion  Osseous structures appear within normal limits for patient age  Impression: No acute cardiopulmonary disease, improved  Workstation performed: [id number]     Xr Chest 2 Views    Result Date: 2/3/2020  Narrative: CHEST INDICATION:   sob  COMPARISON:  None EXAM PERFORMED/VIEWS:  XR CHEST PA & LATERAL FINDINGS: Cardiomediastinal silhouette appears unremarkable  Discoid atelectasis left base  No pneumothorax or pleural effusion  Osseous structures appear within normal limits for patient age  Impression: Discoid atelectasis left base  Workstation performed: [id number]     Xr Shoulder 2+ Views Right    Result Date: 2/3/2020  Narrative: RIGHT SHOULDER INDICATION:   fall  COMPARISON:  None VIEWS:  XR SHOULDER 2+ VW RIGHT Images: 5 FINDINGS: There is no acute fracture or dislocation  Mild degenerative arthritis involves the acromioclavicular and glenohumeral joints  Tiny fragment projects adjacent to the posterior glenoid consistent with old avulsion  No lytic or blastic lesions are seen  Soft tissues are unremarkable  Impression: Mild degenerative arthritis No acute osseous abnormality   Findings are consistent with emergency provider's preliminary reading Workstation performed: [id number]     Xr Knee 4+ Vw Left Injury    Result Date: 2/3/2020  Narrative: LEFT KNEE INDICATION:   fall  COMPARISON:  None VIEWS:  XR KNEE 4+ VW LEFT INJURY Images: 4 FINDINGS: There is no acute fracture or dislocation  There is no joint effusion  No significant degenerative changes  No lytic or blastic lesions are seen  Soft tissues are unremarkable  Impression: No acute osseous abnormality  Findings are consistent with emergency provider's preliminary reading Workstation performed: [id number]     Xr Knee 4+ Vw Right Injury    Result Date: 2/3/2020  Narrative: RIGHT KNEE INDICATION:   fall  COMPARISON:  None VIEWS:  XR KNEE 4+ VW RIGHT INJURY Images: 4 FINDINGS: There is no acute fracture or dislocation  There is no joint effusion  Mild degenerative changes involve the medial and patellofemoral compartments  No lytic or blastic lesions are seen  Soft tissues are unremarkable  Impression: Mild degenerative arthritis No acute osseous abnormality  Findings are consistent with emergency provider's preliminary reading Workstation performed: [id number]     Ct Head Without Contrast    Result Date: 2/2/2020  Narrative: CT BRAIN - WITHOUT CONTRAST INDICATION:   Altered mental status  COMPARISON:  None  TECHNIQUE:  CT examination of the brain was performed  In addition to axial images, coronal 2D reformatted images were created and submitted for interpretation  Radiation dose length product (DLP) for this visit:  836 mGy-cm   This examination, like all CT scans performed in the [facility], was performed utilizing techniques to minimize radiation dose exposure, including the use of iterative reconstruction and automated exposure control  IMAGE QUALITY:  Diagnostic   FINDINGS: PARENCHYMA: Decreased attenuation is noted in periventricular and subcortical white matter demonstrating an appearance that is statistically most likely to represent moderate microangiopathic change  No CT signs of acute infarction  No intracranial mass, mass effect or midline shift  No acute parenchymal hemorrhage  VENTRICLES AND EXTRA-AXIAL SPACES:  Normal for the patient's age  VISUALIZED ORBITS AND PARANASAL SINUSES:  Unremarkable  CALVARIUM AND EXTRACRANIAL SOFT TISSUES:  Normal      Impression: No acute intracranial abnormality  Workstation performed: [id number]     Mri Brain Wo Contrast    Result Date: 2/4/2020  Narrative: MRI BRAIN WITHOUT CONTRAST INDICATION: worsening expressive aphasia  COMPARISON:   MRI of the brain from February 3, 2030 and intervening CTA of the head and neck from February 4, 2020  TECHNIQUE:  Sagittal T1, axial T2, axial FLAIR, axial T1, axial [place] and axial diffusion imaging  IMAGE QUALITY:  Diagnostic  FINDINGS: BRAIN PARENCHYMA:  Interval increase in the amount of cortical restricted diffusion and FLAIR signal abnormality abnormality noted in the left parasagittal posterior frontal lobe, now involving the cingulate gyrus  The multiple focal areas of restricted  diffusion in the left parasagittal parietal lobe has not significantly changed  Findings are consistent with interval progression of the nonhemorrhagic left BUZZ territory infarction  The nonhemorrhagic PCA territory infarcts are stable in appearance  Chronic lacunar infarctions within the bilateral thalami, lentiform nuclei and corona radiata  No cerebellar signal abnormality is identified  Scattered punctate foci of cortical gradient susceptibility artifact likely represent amyloid angiopathy  Moderate cerebral and mild pontine chronic microangiopathic changes  VENTRICLES:  Mild stable lateral ventriculomegaly, likely secondary to central parenchymal volume loss  SELLA AND PITUITARY GLAND:  Normal  ORBITS:  Normal  PARANASAL SINUSES:  Rightward nasal septal deviation  Clear paranasal sinuses   VASCULATURE:  Diminutive left intradural vertebral artery flow-void  Right vertebral basilar artery flow voids are maintained  Intracranial carotid artery flow voids are preserved  The left peripheral BUZZ flow voids are not visualized  The right remain patent  Proximal PCA flow voids are preserved  CALVARIUM AND SKULL BASE:  Normal  EXTRACRANIAL SOFT TISSUES:  Normal      Impression: Interval extension of the nonhemorrhagic left BUZZ territory infarction involving the parasagittal left posterior frontal lobe now extending to the cingulate gyrus  Stable PCA territory parasagittal parietal infarctions  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  I personally discussed this study with Dr Jacinto Chávez on 2/4/2020 at 2:57 PM  Workstation performed: [id number]     Mri Brain Wo Contrast    Result Date: 2/3/2020  Narrative: MRI BRAIN WITHOUT CONTRAST INDICATION: poss stroke  COMPARISON:   None  TECHNIQUE:  Sagittal T1, axial T2, axial FLAIR, axial T1, axial [place] and axial diffusion imaging  IMAGE QUALITY:  Diagnostic  FINDINGS: BRAIN PARENCHYMA:  There are multiple foci of diffusion restriction in the left cerebral hemisphere involving the parasagittal left frontal parietal region, posterior body of  corpus callosum, parasagittal posterior left parietal region Superficial cortical infarct are also noted in the left parietal region at the level of the central sulcus,  postcentral gyrus  There are moderate periventricular and white matter T2 hyperintensities There is no acute hemorrhage seen Scattered subcortical foci of  susceptibility noted in the posterior left temporal region, left parietal region, medial right temporal region, may be related to hypertensive microangiopathy Moderate to periventricular and white matter T2 hyperintensity seen related to chronic small vessel ischemic changes Chronic small vessel ischemic changes in the bettie VENTRICLES:  Normal for the patient's age   SELLA AND PITUITARY GLAND:  Normal  ORBITS:  Normal  PARANASAL SINUSES: Normal  VASCULATURE:  Evaluation of the major intracranial vasculature demonstrates appropriate flow voids  CALVARIUM AND SKULL BASE:  Normal  EXTRACRANIAL SOFT TISSUES:  Normal      Impression: Multiple superficial cortical infarct in the parasagittal left frontal, parietal region and in the precentra,postcentral gyrus,  in the distribution of the left anterior cerebral artery, middle cerebral artery, probably embolic No acute hemorrhage seen Multiple hypointense foci  foot hemosiderin deposition in the subcortical region in the parietal region, temporal region may related to hypertensive microangiopathy other less likely etiology could include amyloid angiopathy  I personally discussed this study with Candace Moreland on 2/3/2020 at 4:20 PM  Workstation performed: [id number]     Fl Barium Swallow Video W Speech    Result Date: 2/5/2020  Narrative: A video barium swallow study was performed by the Department of Speech Pathology  Please refer to the report for the official interpretation  The images are stored for archival purposes only  Study images were not formally reviewed by the Radiology Department  Vas Carotid Complete Study    Result Date: 2/3/2020  Narrative:  [facility] REPORT CLINICAL: Indications: Patient presents with to evaluate for carotid artery stenosis s/p recent TIA/CVA  Operative History: Denies cardiovascular intervention Risk Factors The patient has history of HTN  FINDINGS:  Right        Impression  PSV  EDV (cm/s)  Direction of Flow  Ratio  Dist  ICA                 91          16                      0 90  Mid  ICA                  91          19                      0 90  Prox   ICA    1 - 49%      87          14                      0 87  Dist CCA                  78          11                            Mid CCA                  100          18                      1 08  Prox CCA                  93          18                      0 40  Ext Carotid              114           7 1 14  Prox Vert                 80          12  Antegrade                 Subclavian               196          15                            Innominate               235           0                             Left         Impression  PSV  EDV (cm/s)  Direction of Flow  Ratio  Dist  ICA                 83          18                      0 85  Mid  ICA                  84          17                      0 86  Prox  ICA    1 - 49%      77          13                      0 79  Dist CCA                  85          10                            Mid CCA                   98          17                      0 98  Prox CCA                 100          15                            Ext Carotid               96          10                      0 98  Prox Vert                120          17  Antegrade                 Subclavian               151          12                               CONCLUSION:  Impression RIGHT: There is <50% stenosis noted in the internal carotid artery  Plaque is homogenous and smooth  Vertebral artery flow is antegrade  There is no significant subclavian artery disease  LEFT: There is <50% stenosis noted in the internal carotid artery  Plaque is homogenous and smooth  Vertebral artery flow is antegrade  There is no significant subclavian artery disease  There are not any previous studies for comparison  Internal carotid artery stenosis determination by consensus criteria from: Angelito Prasad et al  Carotid Artery Stenosis: Gray-Scale and Doppler US Diagnosis - Society of Radiologists in [address] [place] [address], Radiology 2003; [phone]    SIGNATURE: Electronically Signed by: Cecil Francois on 2020-02-03 04:15:42 PM       Medication Adjustments and Discharge Medications:  · Summary of Medication Adjustments made as a result of this hospitalization:  None  · Medication Dosing Tapers - Please refer to Discharge Medication List for details on any medication dosing tapers (if applicable to patient)  · Discharge Medication List: See after visit summary for reconciled discharge medications  Wound Care Recommendations:  When applicable, please see wound care section of After Visit Summary  Diet Recommendations at Discharge:  Diet -        Diet Orders   (From admission, onward)             Start     Ordered    02/05/20 1727  Diet Regular; Regular House; Sodium 2 GM  Diet effective now     Question Answer Comment   Diet Type Regular    Regular Regular House    Other Restriction(s): Sodium 2 GM    RD to adjust diet per protocol? Yes        02/05/20 1728                  Incidental Findings:   · None     Test Results Pending at Discharge (will require follow up): · Loop recorder  Will be arranged by cardiology clinic  Hospital Course:     Jeniffer Saba is a 70 y o  female patient who originally presented to the hospital on 2/2/2020 due to generalized body weakness and fall  Patient found to have extensive stroke with expressive aphasia and right-sided body weakness  Stroke workup with telemetry, TTE, LUPE did not reveal cardioembolic cause  Clinically highly suspected for current due embolic stroke  Patient will be having a loop recorder that will be arranged by cardiology clinic as an outpatient  Appointment for Cardiology Clinic set by cardiology team   Swallow study and video fluoroscopy done and patient past for regular food with thin liquids  Appreciate PT evaluation  Blood pressure progressively lowered to 549 to 042J systolic on discharge  Discussed with patient's family and patient regarding treatment plan  Patient medically stable for discharge  Condition at Discharge: stable     Discharge Day Visit / Exam:     Subjective:  No complaints  No events overnight    Vitals: Blood Pressure: 157/73 (02/08/20 0810)  Pulse: 82 (02/08/20 0810)  Temperature: 97 9 °F (36 6 °C) (02/08/20 0810)  Temp Source: Oral (02/08/20 0810)  Respirations: 16 (02/08/20 0810)  Height: 5' 7" (170 2 cm)(confirmed by pt) (02/06/20 1209)  Weight - Scale: 68 5 kg (151 lb) (02/05/20 1342)  SpO2: 97 % (02/08/20 0810)  Exam:     General appearance: alert  Head: Normocephalic, without obvious abnormality, atraumatic  Lungs: clear to auscultation bilaterally  Heart: regular rate and rhythm  Abdomen: soft, non-tender, positive bowel sounds   Back: negative  Extremities: extremities atraumatic, no cyanosis or edema  Neurologic: Motor: Right-sided hemiplegia with expressive aphasia which is unchanged from previous evaluation  Discharge instructions/Information to patient and family:   See after visit summary section titled Discharge Instructions for information provided to patient and family  Planned Readmission:  No     Discharge Statement:  I spent 35 minutes discharging the patient  This time was spent on the day of discharge  I had direct contact with the patient on the day of discharge  Greater than 50% of the total time was spent examining patient, answering all patient questions, arranging and discussing plan of care with patient as well as directly providing post-discharge instructions  Additional time then spent on discharge activities      ** Please Note: This note has been constructed using a voice recognition system **

## 2020-02-08 NOTE — CASE MANAGEMENT
MD indicated patient is ready for dc   [id number]: CM called [facility] spoke to Elvis Rodriguez and requested BLS transport for about 2 PM   Await a call back  0930 Call received from [place] at [facility] will be here at 2 PM for patient  Auth was provided to [place] for transport  Nursing and MD are aware of 2 PM  and Phone number/fax is in dc navigator  Called and informed Caro Kitchen of  time    Plan is [employer]

## 2020-02-24 DIAGNOSIS — I63.512 ACUTE ISCHEMIC CEREBROVASCULAR ACCIDENT (CVA) INVOLVING LEFT MIDDLE CEREBRAL ARTERY TERRITORY (HCC): Primary | ICD-10-CM

## 2020-02-24 NOTE — TELEPHONE ENCOUNTER
She does not need auth for her Loop implant [id number] on 3/4/20 at Dontae Bazzi at [facility] Ref# [id number]

## 2020-03-02 ENCOUNTER — TELEPHONE (OUTPATIENT)
Dept: CARDIOLOGY CLINIC | Facility: CLINIC | Age: 80
End: 2020-03-02

## 2020-03-02 NOTE — TELEPHONE ENCOUNTER
As per Mook Sprague form the device clinic I called the patient and left a message asking her to call back to reschedule her loop implant wound check    The implant is scheduled for 3/19/20

## 2020-03-05 ENCOUNTER — TELEPHONE (OUTPATIENT)
Dept: CARDIOLOGY CLINIC | Facility: CLINIC | Age: 80
End: 2020-03-05

## 2020-03-05 NOTE — TELEPHONE ENCOUNTER
Ericka Prado from [facility] called asking if they need to set up a appointment with cardiology  Patient had left [facility] rehab to the nursing home and they are trying to get the patients appointments all straightened out  Patient was to follow up with your office after her loop recorder which was to be scheduled on 3/19  No appt has been found for the loop recorder date  Son must get in touch with a Jae Free to set up this appt       Waiting for loop recorder to be set up before I can call nursing home for a follow up     [facility] [address]

## 2020-03-11 NOTE — TELEPHONE ENCOUNTER
Appointment made for loop recorder was made for 3/19/2020  Christina from nursing home called back to follow up and I informed her of the loop recorder appointment was made and that we would need to see Erin Wilks 2 weeks later, we made her follow up appointment assuming all goes well with loop recorder    Rigo Hernandez seemed upset that no one informed her of the loop procedure appointment, state that I did not know who scheduled the appointment or whether son was making arrangements for [place] was going to follow up with Hortensia's family

## 2020-03-18 RX ORDER — HYDROCODONE BITARTRATE AND ACETAMINOPHEN 5; 325 MG/1; MG/1
TABLET ORAL
COMMUNITY
Start: 2020-03-02

## 2020-03-18 RX ORDER — ESCITALOPRAM OXALATE 5 MG/1
5 TABLET ORAL DAILY
COMMUNITY
Start: 2020-02-25 | End: 2021-02-24

## 2020-03-18 RX ORDER — LANOLIN ALCOHOL/MO/W.PET/CERES
3 CREAM (GRAM) TOPICAL
COMMUNITY
Start: 2020-02-24 | End: 2021-02-23

## 2020-03-18 RX ORDER — FLUTICASONE PROPIONATE 50 MCG
1 SPRAY, SUSPENSION (ML) NASAL DAILY
COMMUNITY
Start: 2020-02-25 | End: 2021-02-24

## 2020-03-26 NOTE — TELEPHONE ENCOUNTER
Hey Dr Jacky Scheuermann,    Pt loop is cancelled and pt was a stroke pt  Should pt have zio patch or event monitor in between the time of rescheduling pts loop implant?     Thank you

## 2020-05-28 ENCOUNTER — TELEPHONE (OUTPATIENT)
Dept: CARDIOLOGY CLINIC | Facility: CLINIC | Age: 80
End: 2020-05-28

## 2024-12-17 NOTE — ASSESSMENT & PLAN NOTE
Check stat portable chest x-ray oxygen as needed to maintain sats greater than 92%, NPO pending repeat speech eval yes

## (undated) DEVICE — BITE BLOCK MAXI 60FR LF STRAP

## (undated) DEVICE — STOPCOCK 3-WAY